# Patient Record
Sex: FEMALE | Race: WHITE | NOT HISPANIC OR LATINO | Employment: FULL TIME | URBAN - METROPOLITAN AREA
[De-identification: names, ages, dates, MRNs, and addresses within clinical notes are randomized per-mention and may not be internally consistent; named-entity substitution may affect disease eponyms.]

---

## 2017-02-08 ENCOUNTER — OFFICE VISIT (OUTPATIENT)
Dept: FAMILY MEDICINE CLINIC | Facility: CLINIC | Age: 32
End: 2017-02-08

## 2017-02-08 VITALS
HEART RATE: 57 BPM | BODY MASS INDEX: 19.36 KG/M2 | OXYGEN SATURATION: 100 % | WEIGHT: 116.2 LBS | HEIGHT: 65 IN | DIASTOLIC BLOOD PRESSURE: 76 MMHG | TEMPERATURE: 98.5 F | SYSTOLIC BLOOD PRESSURE: 110 MMHG

## 2017-02-08 DIAGNOSIS — G47.00 INSOMNIA, UNSPECIFIED TYPE: ICD-10-CM

## 2017-02-08 DIAGNOSIS — F41.9 ANXIETY: Primary | ICD-10-CM

## 2017-02-08 PROCEDURE — 99214 OFFICE O/P EST MOD 30 MIN: CPT | Performed by: NURSE PRACTITIONER

## 2017-02-08 RX ORDER — CITALOPRAM 40 MG/1
40 TABLET ORAL DAILY
Qty: 90 TABLET | Refills: 3 | Status: SHIPPED | OUTPATIENT
Start: 2017-02-08 | End: 2017-04-07

## 2017-02-08 RX ORDER — BUSPIRONE HYDROCHLORIDE 30 MG/1
30 TABLET ORAL NIGHTLY
Qty: 10 TABLET | Refills: 0 | Status: SHIPPED | OUTPATIENT
Start: 2017-02-08 | End: 2017-02-14 | Stop reason: SDUPTHER

## 2017-02-13 ENCOUNTER — TELEPHONE (OUTPATIENT)
Dept: FAMILY MEDICINE CLINIC | Facility: CLINIC | Age: 32
End: 2017-02-13

## 2017-02-13 NOTE — TELEPHONE ENCOUNTER
Patient was prescribed Buspar to help her sleep patient was told to call and let Sohail know how it worked over the weekend. Patient says it helped with jaw tightness but did not help settle mind to fall asleep and her legs were still restless

## 2017-02-14 DIAGNOSIS — G47.00 INSOMNIA, UNSPECIFIED TYPE: ICD-10-CM

## 2017-02-14 DIAGNOSIS — F41.9 ANXIETY: ICD-10-CM

## 2017-02-14 RX ORDER — BUSPIRONE HYDROCHLORIDE 30 MG/1
TABLET ORAL
Qty: 10 TABLET | Refills: 0 | Status: SHIPPED | OUTPATIENT
Start: 2017-02-14 | End: 2017-04-07

## 2017-02-14 NOTE — TELEPHONE ENCOUNTER
Patient said she would really like to try something else. She just found out her sister passed away last night and is really wanting something that is going to help

## 2017-02-15 RX ORDER — ZOLPIDEM TARTRATE 10 MG/1
10 TABLET ORAL NIGHTLY PRN
Qty: 30 TABLET | Refills: 0 | OUTPATIENT
Start: 2017-02-15 | End: 2017-04-07

## 2017-02-27 ENCOUNTER — OFFICE VISIT (OUTPATIENT)
Dept: FAMILY MEDICINE CLINIC | Facility: CLINIC | Age: 32
End: 2017-02-27

## 2017-02-27 VITALS
SYSTOLIC BLOOD PRESSURE: 102 MMHG | OXYGEN SATURATION: 98 % | DIASTOLIC BLOOD PRESSURE: 60 MMHG | HEIGHT: 65 IN | HEART RATE: 69 BPM | TEMPERATURE: 98.2 F | WEIGHT: 113 LBS | BODY MASS INDEX: 18.83 KG/M2

## 2017-02-27 DIAGNOSIS — G43.809 OTHER TYPE OF MIGRAINE: Primary | ICD-10-CM

## 2017-02-27 PROBLEM — G43.909 MIGRAINE HEADACHE: Status: ACTIVE | Noted: 2017-02-27

## 2017-02-27 PROCEDURE — 99213 OFFICE O/P EST LOW 20 MIN: CPT | Performed by: NURSE PRACTITIONER

## 2017-02-27 NOTE — PROGRESS NOTES
"Subjective   Peggy Davenport is a 32 y.o. female.     History of Present Illness   Patient presents to the office today with migraine that she's had consistently for the past 4 days.  Patient states whenever she gets one to usually take Advil which knocks it out this time she took Advil did work but the headache returned after an hour.  He is the same as it always is gone the right frontal side and affecting her ear causing pressure in her ear.  Patient does not have any light sensitivity or noise sensitivity.  Patient states she has a headache almost every single day and Advil usually works for it.  She's never tried any other medication for her headaches other than Advil and she's never seen a neurologist for  The following portions of the patient's history were reviewed and updated as appropriate: allergies, current medications, past social history and problem list.    Review of Systems   Neurological: Positive for headaches.   All other systems reviewed and are negative.      Objective   Visit Vitals   • /60 (BP Location: Left arm, Patient Position: Sitting)   • Pulse 69   • Temp 98.2 °F (36.8 °C)   • Ht 65\" (165.1 cm)   • Wt 113 lb (51.3 kg)   • SpO2 98%   • BMI 18.8 kg/m2     Physical Exam   Constitutional: She is oriented to person, place, and time. Vital signs are normal. She appears well-developed and well-nourished. No distress.   HENT:   Head: Normocephalic.   Cardiovascular: Normal rate, regular rhythm and normal heart sounds.    Pulmonary/Chest: Effort normal and breath sounds normal.   Neurological: She is alert and oriented to person, place, and time. Gait normal.   Psychiatric: She has a normal mood and affect. Her behavior is normal. Judgment and thought content normal.   Vitals reviewed.      Assessment/Plan   Problem List Items Addressed This Visit        Cardiovascular and Mediastinum    Migraine headache - Primary        treximet samples given   rto prn      Discussed referral to " neurology for possible Botox injections patient stated that she would like to think about it

## 2017-03-13 RX ORDER — PRAMIPEXOLE DIHYDROCHLORIDE 0.12 MG/1
0.12 TABLET ORAL
Qty: 30 TABLET | Refills: 0 | Status: SHIPPED | OUTPATIENT
Start: 2017-03-13 | End: 2017-04-07

## 2017-03-23 DIAGNOSIS — F41.9 ANXIETY: Primary | ICD-10-CM

## 2017-03-23 RX ORDER — TRAZODONE HYDROCHLORIDE 100 MG/1
100 TABLET ORAL NIGHTLY
Qty: 30 TABLET | Refills: 6 | Status: SHIPPED | OUTPATIENT
Start: 2017-03-23 | End: 2017-06-28 | Stop reason: ALTCHOICE

## 2017-04-07 ENCOUNTER — HOSPITAL ENCOUNTER (EMERGENCY)
Facility: HOSPITAL | Age: 32
Discharge: HOME | End: 2017-04-08
Attending: EMERGENCY MEDICINE | Admitting: EMERGENCY MEDICINE

## 2017-04-07 ENCOUNTER — APPOINTMENT (OUTPATIENT)
Dept: CT IMAGING | Facility: HOSPITAL | Age: 32
End: 2017-04-07

## 2017-04-07 DIAGNOSIS — M54.50 ACUTE LOW BACK PAIN WITHOUT SCIATICA, UNSPECIFIED BACK PAIN LATERALITY: Primary | ICD-10-CM

## 2017-04-07 LAB
ALBUMIN SERPL-MCNC: 4.2 G/DL (ref 3.5–5.2)
ALBUMIN/GLOB SERPL: 1.5 G/DL
ALP SERPL-CCNC: 57 U/L (ref 39–117)
ALT SERPL W P-5'-P-CCNC: 10 U/L (ref 1–33)
ANION GAP SERPL CALCULATED.3IONS-SCNC: 11.8 MMOL/L
AST SERPL-CCNC: 9 U/L (ref 1–32)
BASOPHILS # BLD AUTO: 0.06 10*3/MM3 (ref 0–0.2)
BASOPHILS NFR BLD AUTO: 0.8 % (ref 0–1.5)
BILIRUB SERPL-MCNC: <0.2 MG/DL (ref 0.1–1.2)
BILIRUB UR QL STRIP: NEGATIVE
BUN BLD-MCNC: 10 MG/DL (ref 6–20)
BUN/CREAT SERPL: 14.1 (ref 7–25)
CALCIUM SPEC-SCNC: 9.3 MG/DL (ref 8.6–10.5)
CHLORIDE SERPL-SCNC: 101 MMOL/L (ref 98–107)
CLARITY UR: CLEAR
CO2 SERPL-SCNC: 27.2 MMOL/L (ref 22–29)
COLOR UR: YELLOW
CREAT BLD-MCNC: 0.71 MG/DL (ref 0.57–1)
DEPRECATED RDW RBC AUTO: 48.4 FL (ref 37–54)
EOSINOPHIL # BLD AUTO: 0.24 10*3/MM3 (ref 0–0.7)
EOSINOPHIL NFR BLD AUTO: 3.2 % (ref 0.3–6.2)
ERYTHROCYTE [DISTWIDTH] IN BLOOD BY AUTOMATED COUNT: 14.4 % (ref 11.7–13)
GFR SERPL CREATININE-BSD FRML MDRD: 95 ML/MIN/1.73
GLOBULIN UR ELPH-MCNC: 2.8 GM/DL
GLUCOSE BLD-MCNC: 92 MG/DL (ref 65–99)
GLUCOSE UR STRIP-MCNC: NEGATIVE MG/DL
HCG SERPL QL: NEGATIVE
HCT VFR BLD AUTO: 37.8 % (ref 35.6–45.5)
HGB BLD-MCNC: 12.1 G/DL (ref 11.9–15.5)
HGB UR QL STRIP.AUTO: NEGATIVE
IMM GRANULOCYTES # BLD: 0.02 10*3/MM3 (ref 0–0.03)
IMM GRANULOCYTES NFR BLD: 0.3 % (ref 0–0.5)
KETONES UR QL STRIP: NEGATIVE
LEUKOCYTE ESTERASE UR QL STRIP.AUTO: NEGATIVE
LIPASE SERPL-CCNC: 41 U/L (ref 13–60)
LYMPHOCYTES # BLD AUTO: 2.78 10*3/MM3 (ref 0.9–4.8)
LYMPHOCYTES NFR BLD AUTO: 37.1 % (ref 19.6–45.3)
MCH RBC QN AUTO: 29.4 PG (ref 26.9–32)
MCHC RBC AUTO-ENTMCNC: 32 G/DL (ref 32.4–36.3)
MCV RBC AUTO: 91.7 FL (ref 80.5–98.2)
MONOCYTES # BLD AUTO: 0.52 10*3/MM3 (ref 0.2–1.2)
MONOCYTES NFR BLD AUTO: 6.9 % (ref 5–12)
NEUTROPHILS # BLD AUTO: 3.88 10*3/MM3 (ref 1.9–8.1)
NEUTROPHILS NFR BLD AUTO: 51.7 % (ref 42.7–76)
NITRITE UR QL STRIP: NEGATIVE
PH UR STRIP.AUTO: 7.5 [PH] (ref 5–8)
PLATELET # BLD AUTO: 274 10*3/MM3 (ref 140–500)
PMV BLD AUTO: 10 FL (ref 6–12)
POTASSIUM BLD-SCNC: 4.4 MMOL/L (ref 3.5–5.2)
PROT SERPL-MCNC: 7 G/DL (ref 6–8.5)
PROT UR QL STRIP: NEGATIVE
RBC # BLD AUTO: 4.12 10*6/MM3 (ref 3.9–5.2)
SODIUM BLD-SCNC: 140 MMOL/L (ref 136–145)
SP GR UR STRIP: 1.02 (ref 1–1.03)
UROBILINOGEN UR QL STRIP: NORMAL
WBC NRBC COR # BLD: 7.5 10*3/MM3 (ref 4.5–10.7)

## 2017-04-07 PROCEDURE — 96361 HYDRATE IV INFUSION ADD-ON: CPT

## 2017-04-07 PROCEDURE — 96375 TX/PRO/DX INJ NEW DRUG ADDON: CPT

## 2017-04-07 PROCEDURE — 25010000002 KETOROLAC TROMETHAMINE PER 15 MG: Performed by: PHYSICIAN ASSISTANT

## 2017-04-07 PROCEDURE — 96374 THER/PROPH/DIAG INJ IV PUSH: CPT

## 2017-04-07 PROCEDURE — 81003 URINALYSIS AUTO W/O SCOPE: CPT | Performed by: EMERGENCY MEDICINE

## 2017-04-07 PROCEDURE — 84703 CHORIONIC GONADOTROPIN ASSAY: CPT | Performed by: EMERGENCY MEDICINE

## 2017-04-07 PROCEDURE — 80053 COMPREHEN METABOLIC PANEL: CPT | Performed by: EMERGENCY MEDICINE

## 2017-04-07 PROCEDURE — 99283 EMERGENCY DEPT VISIT LOW MDM: CPT

## 2017-04-07 PROCEDURE — 36415 COLL VENOUS BLD VENIPUNCTURE: CPT | Performed by: EMERGENCY MEDICINE

## 2017-04-07 PROCEDURE — 0 IOPAMIDOL 61 % SOLUTION: Performed by: EMERGENCY MEDICINE

## 2017-04-07 PROCEDURE — 83690 ASSAY OF LIPASE: CPT | Performed by: EMERGENCY MEDICINE

## 2017-04-07 PROCEDURE — 85025 COMPLETE CBC W/AUTO DIFF WBC: CPT | Performed by: EMERGENCY MEDICINE

## 2017-04-07 PROCEDURE — 25010000002 ONDANSETRON PER 1 MG: Performed by: PHYSICIAN ASSISTANT

## 2017-04-07 PROCEDURE — 74177 CT ABD & PELVIS W/CONTRAST: CPT

## 2017-04-07 RX ORDER — KETOROLAC TROMETHAMINE 15 MG/ML
15 INJECTION, SOLUTION INTRAMUSCULAR; INTRAVENOUS ONCE
Status: COMPLETED | OUTPATIENT
Start: 2017-04-07 | End: 2017-04-07

## 2017-04-07 RX ORDER — SODIUM CHLORIDE 0.9 % (FLUSH) 0.9 %
10 SYRINGE (ML) INJECTION AS NEEDED
Status: DISCONTINUED | OUTPATIENT
Start: 2017-04-07 | End: 2017-04-08 | Stop reason: HOSPADM

## 2017-04-07 RX ORDER — ONDANSETRON 2 MG/ML
4 INJECTION INTRAMUSCULAR; INTRAVENOUS ONCE
Status: COMPLETED | OUTPATIENT
Start: 2017-04-07 | End: 2017-04-07

## 2017-04-07 RX ADMIN — ONDANSETRON 4 MG: 2 INJECTION INTRAMUSCULAR; INTRAVENOUS at 23:07

## 2017-04-07 RX ADMIN — KETOROLAC TROMETHAMINE 15 MG: 15 INJECTION, SOLUTION INTRAMUSCULAR; INTRAVENOUS at 23:09

## 2017-04-07 RX ADMIN — IOPAMIDOL 85 ML: 612 INJECTION, SOLUTION INTRAVENOUS at 23:45

## 2017-04-07 RX ADMIN — SODIUM CHLORIDE 1000 ML: 9 INJECTION, SOLUTION INTRAVENOUS at 23:05

## 2017-04-07 NOTE — ED NOTES
"Pt seen at Urgent Care, referred to ED for possible UTI.    Pt states \"I'm not having the urge to go, so I have to remind myself to go.  When I do go, it doesn't feel normal, like it's thick.  I'm also having a lot of pain when I urinate and lower back pain\".     Charlotte Strange RN  04/07/17 1942    "

## 2017-04-08 VITALS
OXYGEN SATURATION: 100 % | WEIGHT: 120 LBS | SYSTOLIC BLOOD PRESSURE: 124 MMHG | RESPIRATION RATE: 16 BRPM | HEIGHT: 65 IN | HEART RATE: 57 BPM | TEMPERATURE: 98.3 F | DIASTOLIC BLOOD PRESSURE: 86 MMHG | BODY MASS INDEX: 19.99 KG/M2

## 2017-04-08 LAB — WHOLE BLOOD HOLD SPECIMEN: NORMAL

## 2017-04-08 RX ORDER — NAPROXEN 500 MG/1
500 TABLET ORAL 2 TIMES DAILY WITH MEALS
Qty: 20 TABLET | Refills: 0 | Status: SHIPPED | OUTPATIENT
Start: 2017-04-08 | End: 2017-08-03

## 2017-04-08 RX ORDER — METHOCARBAMOL 500 MG/1
1000 TABLET, FILM COATED ORAL 4 TIMES DAILY
Qty: 30 TABLET | Refills: 0 | Status: SHIPPED | OUTPATIENT
Start: 2017-04-08 | End: 2017-08-03

## 2017-04-08 NOTE — DISCHARGE INSTRUCTIONS
Home, rest, medicine as directed, home medicine as prescribed, follow up with PCP or OB/GYN for recheck. Return to care with further concerns.

## 2017-04-08 NOTE — ED PROVIDER NOTES
EMERGENCY DEPARTMENT ENCOUNTER    CHIEF COMPLAINT  Chief Complaint: Dysuria  History given by: Patient  History limited by:   Room Number: 08/08  PMD: LUCIANA Vigil      HPI:  Pt is a 32 y.o. female who presents complaining of dysuria that onset several weeks ago. Pt reports that she is having lack of urgency to urinate. Pt reports that when urinating she reports a burning senstation with straining. Pt also reports some abd pain and low back pain. Pt describes the abd pain as sharp. She denies any decreased liquid intake. Pt denies any hx of nephrolithiasis or ovarian cysts, denies vaginal discharge or STD concerns.  Pt was seen at  today and referred to the ED.     Duration:  Several weeks  Onset: gradual  Timing: intermittent  Location: abd, lower back  Radiation: none  Quality: sharp   Intensity/Severity: moderate  Progression: unchanged  Associated Symptoms: abd pain, decreased urgency, dysuria, low back pain  Aggravating Factors: urination  Alleviating Factors: none  Previous Episodes: none  Treatment before arrival: seen at  and referred to ED.     PAST MEDICAL HISTORY  Active Ambulatory Problems     Diagnosis Date Noted   • Routine general medical examination at a health care facility 08/10/2016   • Chronic fatigue 08/10/2016   • Muscle ache 08/10/2016   • Anxiety 08/10/2016   • Pap smear, as part of routine gynecological examination 09/22/2016   • Diarrhea 09/22/2016   • Insomnia 02/08/2017   • Migraine headache 02/27/2017     Resolved Ambulatory Problems     Diagnosis Date Noted   • No Resolved Ambulatory Problems     Past Medical History:   Diagnosis Date   • GERD (gastroesophageal reflux disease)    • Hernia, hiatal    • Ulcer of abdomen wall 2007       PAST SURGICAL HISTORY  Past Surgical History:   Procedure Laterality Date   • ENDOSCOPY N/A 6/22/2016    Procedure: ESOPHAGOGASTRODUODENOSCOPY WITH BIOPSIES;  Surgeon: Tim Hunter MD;  Location: [facility] ENDOSCOPY;  Service:    • EYE  SURGERY Left 1986    STAUBISMIS   • LAPAROSCOPIC TUBAL LIGATION  2012       FAMILY HISTORY  Family History   Problem Relation Age of Onset   • Diabetes Mother    • Asthma Father    • Hyperlipidemia Father    • Hypertension Father    • Cervical cancer Maternal Grandmother    • Uterine cancer Paternal Grandmother        SOCIAL HISTORY  Social History     Social History   • Marital status: Single     Spouse name: N/A   • Number of children: N/A   • Years of education: N/A     Occupational History   • Not on file.     Social History Main Topics   • Smoking status: Never Smoker   • Smokeless tobacco: Never Used   • Alcohol use No   • Drug use: No   • Sexual activity: Defer     Other Topics Concern   • Not on file     Social History Narrative       ALLERGIES  Review of patient's allergies indicates no known allergies.    REVIEW OF SYSTEMS  Review of Systems   Constitutional: Negative for fever.   HENT: Negative for sore throat.    Eyes: Negative.    Respiratory: Negative for cough and shortness of breath.    Cardiovascular: Negative for chest pain.   Gastrointestinal: Positive for abdominal pain. Negative for diarrhea and vomiting.   Genitourinary: Positive for difficulty urinating, dysuria and urgency ( decreased).   Musculoskeletal: Positive for back pain. Negative for neck pain.   Skin: Negative for rash.   Allergic/Immunologic: Negative.    Neurological: Negative for weakness, numbness and headaches.   Hematological: Negative.    Psychiatric/Behavioral: Negative.    All other systems reviewed and are negative.      PHYSICAL EXAM  ED Triage Vitals   Temp Heart Rate Resp BP SpO2   04/07/17 1948 04/07/17 1942 04/07/17 1942 04/07/17 1948 04/07/17 1942   97.6 °F (36.4 °C) 88 14 107/71 100 %      Temp src Heart Rate Source Patient Position BP Location FiO2 (%)   04/07/17 1948 04/07/17 1942 04/07/17 1948 04/07/17 1948 --   Tympanic Monitor Sitting Right arm        Physical Exam   Constitutional: She is oriented to person,  place, and time and well-developed, well-nourished, and in no distress. No distress.   HENT:   Head: Normocephalic and atraumatic.   Eyes: EOM are normal. Pupils are equal, round, and reactive to light.   Neck: Normal range of motion. Neck supple.   Cardiovascular: Normal rate, regular rhythm and normal heart sounds.    Pulmonary/Chest: Effort normal and breath sounds normal. No respiratory distress.   Abdominal: Soft. Bowel sounds are normal. There is tenderness in the suprapubic area. There is no rebound and no guarding.   Musculoskeletal: Normal range of motion. She exhibits no edema.   Neurological: She is alert and oriented to person, place, and time. She has normal sensation and normal strength.   Skin: Skin is warm and dry. No rash noted.   Psychiatric: Mood and affect normal.   Nursing note and vitals reviewed.      LAB RESULTS  Lab Results (last 24 hours)     Procedure Component Value Units Date/Time    POCT Urinalysis (manual dipstick) [[id number]]  (Abnormal) Collected:  04/07/17 1840    Specimen:  Urine Updated:  04/07/17 1845     Color Libertad     Clarity, UA Hazy (A)     Glucose, UA Negative mg/dL      Bilirubin Negative     Ketones, UA Negative     Specific Gravity  1.025     Blood, UA Negative     pH, Urine 5.0     Protein, POC Trace (A) mg/dL      Urobilinogen, UA Normal     Leukocytes Negative     Nitrite, UA Negative    Urine Culture [[id number]] Collected:  04/07/17 1904    Specimen:  Urine from Urine, Clean Catch Updated:  04/07/17 1905    Urinalysis With / Culture If Indicated [[id number]]  (Normal) Collected:  04/07/17 2003    Specimen:  Urine from Urine, Clean Catch Updated:  04/07/17 2038     Color, UA Yellow     Appearance, UA Clear     pH, UA 7.5     Specific Gravity, UA 1.017     Glucose, UA Negative     Ketones, UA Negative     Bilirubin, UA Negative     Blood, UA Negative     Protein, UA Negative     Leuk Esterase, UA Negative     Nitrite, UA Negative     Urobilinogen, UA 1.0 E.U./dL     Narrative:       Urine microscopic not indicated.    CBC & Differential [[id number]] Collected:  04/07/17 2037    Specimen:  Blood Updated:  04/07/17 2054    Narrative:       The following orders were created for panel order CBC & Differential.  Procedure                               Abnormality         Status                     ---------                               -----------         ------                     CBC Auto Differential[[id number]]         Abnormal            Final result                 Please view results for these tests on the individual orders.    Comprehensive Metabolic Panel [[id number]] Collected:  04/07/17 2037    Specimen:  Blood Updated:  04/07/17 2121     Glucose 92 mg/dL      BUN 10 mg/dL      Creatinine 0.71 mg/dL      Sodium 140 mmol/L      Potassium 4.4 mmol/L      Chloride 101 mmol/L      CO2 27.2 mmol/L      Calcium 9.3 mg/dL      Total Protein 7.0 g/dL      Albumin 4.20 g/dL      ALT (SGPT) 10 U/L      AST (SGOT) 9 U/L      Alkaline Phosphatase 57 U/L      Total Bilirubin <0.2 mg/dL      eGFR Non African Amer 95 mL/min/1.73      Globulin 2.8 gm/dL      A/G Ratio 1.5 g/dL      BUN/Creatinine Ratio 14.1     Anion Gap 11.8 mmol/L     Lipase [[id number]]  (Normal) Collected:  04/07/17 2037    Specimen:  Blood Updated:  04/07/17 2116     Lipase 41 U/L     hCG, Serum, Qualitative [[id number]]  (Normal) Collected:  04/07/17 2037    Specimen:  Blood Updated:  04/07/17 2110     HCG Qualitative Negative    CBC Auto Differential [[id number]]  (Abnormal) Collected:  04/07/17 2037    Specimen:  Blood Updated:  04/07/17 2054     WBC 7.50 10*3/mm3      RBC 4.12 10*6/mm3      Hemoglobin 12.1 g/dL      Hematocrit 37.8 %      MCV 91.7 fL      MCH 29.4 pg      MCHC 32.0 (L) g/dL      RDW 14.4 (H) %      RDW-SD 48.4 fl      MPV 10.0 fL      Platelets 274 10*3/mm3      Neutrophil % 51.7 %      Lymphocyte % 37.1 %      Monocyte % 6.9 %      Eosinophil % 3.2 %      Basophil % 0.8 %      Immature Grans % 0.3 %       Neutrophils, Absolute 3.88 10*3/mm3      Lymphocytes, Absolute 2.78 10*3/mm3      Monocytes, Absolute 0.52 10*3/mm3      Eosinophils, Absolute 0.24 10*3/mm3      Basophils, Absolute 0.06 10*3/mm3      Immature Grans, Absolute 0.02 10*3/mm3           I ordered the above labs and reviewed the results    RADIOLOGY  CT Abdomen Pelvis With Contrast   Final Result   Final result by Etienne High MD (17 00:11:45)     Narrative:     CT SCAN OF THE ABDOMEN AND PELVIS WITH INTRAVENOUS CONTRAST     HISTORY: Dysuria and urinary urgency.     FINDINGS: The CT scan was performed as an emergency procedure through  the abdomen and pelvis with intravenous contrast and demonstrates the  followin. The kidneys are normal in size and there is symmetric enhancement.  There is no evidence of renal obstruction. The urinary bladder has a  smooth contour and shows no wall thickening.     2. The lung bases are clear. The liver, spleen, pancreas, and both  adrenal glands were unremarkable. The aorta and retroperitoneal  structures appear normal.     3. The large and small bowel loops are normal in caliber. There is a  moderate amount of fluid scattered in the small bowel which is  nonspecific but could relate to an element of mild gastroenteritis and  further clinical correlation is recommended.     4. In the pelvis, there is mild generalized increased vascularity of the  uterus which is a nonspecific finding and may relate to the patient's  menstrual cycle. No focal lesions are identified. There is a small right  ovarian cyst measuring 1.7 cm. There is also very small amount of free  fluid in the cul-de-sac which is nonspecific and may be physiologic or  possibly related to recent cyst rupture.     This report was finalized on 2017 12:11 AM by Dr. Carlton High MD.             I ordered the above noted radiological studies. Interpreted by radiologist. Reviewed by me in PACS.       PROCEDURES  Procedures      PROGRESS  AND CONSULTS  ED Course   10:55 PM  Ordered CT abd/pel. Ordered Toradol and Zofran.   12:35 AM  Rechecked with pt. Informed pt of her labs showing nothing remarkable and her CT showing ovarian cyst, but otherwise negative. Discussed with pt about plan to discharge and instructed to follow up with OBGYN. Pt understands and agrees with plan. All concerns addressed.       MEDICAL DECISION MAKING      MDM       DIAGNOSIS  Final diagnoses:   Acute low back pain without sciatica, unspecified back pain laterality       DISPOSITION  DISCHARGE    Patient discharged in stable condition.    Reviewed implications of results, diagnosis, meds, responsibility to follow up, warning signs and symptoms of possible worsening, potential complications and reasons to return to ER.    Patient/Family voiced understanding of above instructions.    Discussed plan for discharge, as there is no emergent indication for admission.  Pt/family is agreeable and understands need for follow up and repeat testing.  Pt is aware that discharge does not mean that nothing is wrong but it indicates no emergency is present that requires admission and they must continue care with follow-up as given below or physician of their choice.     FOLLOW-UP  Sohail Mckinney, APRN  [address] [id number] [phone]    Schedule an appointment as soon as possible for a visit in 3 days           Medication List      New Prescriptions          methocarbamol 500 MG tablet   Commonly known as:  ROBAXIN   Take 2 tablets by mouth 4 (Four) Times a Day.       naproxen 500 MG tablet   Commonly known as:  NAPROSYN   Take 1 tablet by mouth 2 (Two) Times a Day With Meals.               Latest Documented Vital Signs:  As of 1:29 AM  BP- 124/86 HR- 57 Temp- 98.3 °F (36.8 °C) (Tympanic) O2 sat- 100%    --  Documentation assistance provided by no Salvador for Roberto Carlos Tierney PA-C.  Information recorded by the no was done at my direction and has been  verified and validated by me.       Romeo Salvador  04/07/17 2256       Romeo Salvador  04/08/17 0113       ZULMA Pacheco  04/08/17 0129

## 2017-04-11 ENCOUNTER — TELEPHONE (OUTPATIENT)
Dept: SOCIAL WORK | Facility: HOSPITAL | Age: 32
End: 2017-04-11

## 2017-04-11 NOTE — TELEPHONE ENCOUNTER
Spoke with pt today in f/u and she states that she is feeling much better. She was able to get her scripts filled and is taking them as needed. She has not ciro a f/u with her PCP and will do so if necessary. No other questions or concerns voiced by pt at this time.  Alicia MAN

## 2017-06-28 ENCOUNTER — OFFICE VISIT (OUTPATIENT)
Dept: FAMILY MEDICINE CLINIC | Facility: CLINIC | Age: 32
End: 2017-06-28

## 2017-06-28 VITALS
OXYGEN SATURATION: 98 % | DIASTOLIC BLOOD PRESSURE: 64 MMHG | HEIGHT: 65 IN | SYSTOLIC BLOOD PRESSURE: 118 MMHG | RESPIRATION RATE: 14 BRPM | WEIGHT: 114.4 LBS | TEMPERATURE: 98.8 F | BODY MASS INDEX: 19.06 KG/M2 | HEART RATE: 67 BPM

## 2017-06-28 DIAGNOSIS — G43.809 OTHER TYPE OF MIGRAINE: ICD-10-CM

## 2017-06-28 DIAGNOSIS — G47.00 INSOMNIA, UNSPECIFIED TYPE: Primary | ICD-10-CM

## 2017-06-28 PROCEDURE — 99214 OFFICE O/P EST MOD 30 MIN: CPT | Performed by: NURSE PRACTITIONER

## 2017-06-28 RX ORDER — ESZOPICLONE 2 MG/1
2 TABLET, FILM COATED ORAL NIGHTLY
Qty: 30 TABLET | Refills: 0 | Status: SHIPPED | OUTPATIENT
Start: 2017-06-28 | End: 2017-08-03

## 2017-06-28 NOTE — PROGRESS NOTES
"Subjective   Peggy Davenport is a 32 y.o. female.     History of Present Illness   Patient presenting to the office today to discuss her insomnia.  At first the trazodone was working very well but lately she will wake up extremely groggy and will take her very long time to wake up in figure out what she needs to do for the day.  This concerns her and also she has small children and she feels that they needed her in the night she would be able to wake up to help him.  She's here to see if there is another type of medication that will work for her.  In the past she has used Ambien and didn't like either due to the fact that it just knocks her out.  Unfortunately she works third shift 3 nights a week so she's changing back and forth between sleeping at night and sleeping during the day which does not help and is the root of the problem.    She would also like referral to neurology for her migraines they are continually getting worse.    The following portions of the patient's history were reviewed and updated as appropriate: allergies, current medications, past social history and problem list.    Review of Systems   Constitutional: Negative for activity change.   All other systems reviewed and are negative.      Objective   /64 (BP Location: Right arm, Patient Position: Sitting, Cuff Size: Adult)  Pulse 67  Temp 98.8 °F (37.1 °C) (Oral)   Resp 14  Ht 65\" (165.1 cm)  Wt 114 lb 6.4 oz (51.9 kg)  SpO2 98%  BMI 19.04 kg/m2  Physical Exam   Constitutional: She is oriented to person, place, and time. Vital signs are normal. She appears well-developed and well-nourished. No distress.   HENT:   Head: Normocephalic.   Cardiovascular: Normal rate, regular rhythm and normal heart sounds.    Pulmonary/Chest: Effort normal and breath sounds normal.   Neurological: She is alert and oriented to person, place, and time. Gait normal.   Psychiatric: She has a normal mood and affect. Her behavior is normal. Judgment and " thought content normal.   Vitals reviewed.      Assessment/Plan   Problem List Items Addressed This Visit        Cardiovascular and Mediastinum    Migraine headache    Relevant Orders    Ambulatory Referral to Neurology       Other    Insomnia - Primary    Relevant Medications    eszopiclone (LUNESTA) 2 MG tablet        rto in 3 mons

## 2017-07-17 RX ORDER — TIZANIDINE HYDROCHLORIDE 4 MG/1
4 CAPSULE, GELATIN COATED ORAL 3 TIMES DAILY
Qty: 30 CAPSULE | Refills: 0 | Status: SHIPPED | OUTPATIENT
Start: 2017-07-17 | End: 2017-08-03

## 2017-08-03 ENCOUNTER — OFFICE VISIT (OUTPATIENT)
Dept: NEUROLOGY | Facility: CLINIC | Age: 32
End: 2017-08-03

## 2017-08-03 VITALS
BODY MASS INDEX: 20.32 KG/M2 | HEIGHT: 64 IN | DIASTOLIC BLOOD PRESSURE: 62 MMHG | SYSTOLIC BLOOD PRESSURE: 120 MMHG | WEIGHT: 119 LBS

## 2017-08-03 DIAGNOSIS — R41.3 MEMORY LOSS: ICD-10-CM

## 2017-08-03 DIAGNOSIS — G43.009 MIGRAINE WITHOUT AURA AND WITHOUT STATUS MIGRAINOSUS, NOT INTRACTABLE: Primary | ICD-10-CM

## 2017-08-03 PROCEDURE — 99244 OFF/OP CNSLTJ NEW/EST MOD 40: CPT | Performed by: PSYCHIATRY & NEUROLOGY

## 2017-08-03 RX ORDER — TIZANIDINE 4 MG/1
4 TABLET ORAL NIGHTLY
Qty: 30 TABLET | Refills: 11 | Status: SHIPPED | OUTPATIENT
Start: 2017-08-03 | End: 2017-08-31 | Stop reason: SDUPTHER

## 2017-08-03 NOTE — PROGRESS NOTES
Subjective:     Patient ID: Peggy Davenport is a 32 y.o. female.    History of Present Illness     The patient is a 32-year-old right-handed woman who was seen for further evaluation of headaches. The patient was seen today in consultation per the request of Sohail Mckinney.  The patient has had headaches for about a year these are constant and can be severe at times they start in her neck and feels like a squeezing or pressure sensation are mainly over the top of the head the time she gets some blurred vision and light and noise sensitivity as well as nausea.  This seems to build up as the day goes on.  She also complains of memory loss over the same period of time.  Is not had a neurodiagnostic studies.  She is tried various over-the-counter medicines without success.  She was given Treximet which was not helpful.  His been under fair amount of stress.    The following portions of the patient's history were reviewed and updated as appropriate: allergies, current medications, past family history, past medical history, past social history, past surgical history and problem list.     Family History   Problem Relation Age of Onset   • Diabetes Mother    • Migraines Mother    • Dementia Mother    • Hypertension Mother    • Thyroid disease Mother    • Asthma Father    • Hyperlipidemia Father    • Hypertension Father    • Cervical cancer Maternal Grandmother    • Dementia Maternal Grandmother    • Uterine cancer Paternal Grandmother    • Dementia Paternal Grandmother    • Stroke Paternal Grandmother    • Hypertension Paternal Grandmother    • Heart disease Paternal Grandmother    • Diabetes Paternal Grandmother    • Thyroid disease Paternal Grandmother    • Kidney disease Paternal Grandmother    • Heart disease Maternal Grandfather    • Heart disease Paternal Grandfather      Active Ambulatory Problems     Diagnosis Date Noted   • Routine general medical examination at a health care facility 08/10/2016   • Chronic fatigue  08/10/2016   • Muscle ache 08/10/2016   • Anxiety 08/10/2016   • Diarrhea 09/22/2016   • Insomnia 02/08/2017   • Migraine headache 02/27/2017   • Memory loss 08/03/2017     Resolved Ambulatory Problems     Diagnosis Date Noted   • Pap smear, as part of routine gynecological examination 09/22/2016     Past Medical History:   Diagnosis Date   • Depression    • GERD (gastroesophageal reflux disease)    • Hernia, hiatal    • Migraine    • Murmur, cardiac    • Ulcer of abdomen wall 2007     Social History     Social History   • Marital status: Single     Spouse name: N/A   • Number of children: N/A   • Years of education: N/A     Occupational History   • Not on file.     Social History Main Topics   • Smoking status: Never Smoker   • Smokeless tobacco: Never Used   • Alcohol use No   • Drug use: No   • Sexual activity: Defer     Other Topics Concern   • Not on file     Social History Narrative       Current Outpatient Prescriptions:   •  B Complex Vitamins (VITAMIN B COMPLEX PO), Take  by mouth., Disp: , Rfl:   •  sertraline (ZOLOFT) 50 MG tablet, Take 1 tablet by mouth Daily., Disp: 30 tablet, Rfl: 6  •  tiZANidine (ZANAFLEX) 4 MG tablet, Take 1 tablet by mouth Every Night., Disp: 30 tablet, Rfl: 11    Review of Systems   Constitutional: Positive for activity change, appetite change, chills, diaphoresis, fatigue, fever and unexpected weight change.   HENT: Positive for ear pain, sinus pressure, tinnitus and trouble swallowing. Negative for congestion, dental problem, drooling, ear discharge, facial swelling, hearing loss, mouth sores, nosebleeds, postnasal drip, rhinorrhea, sneezing, sore throat and voice change.    Eyes: Positive for photophobia, pain, redness, itching and visual disturbance. Negative for discharge.   Respiratory: Negative.    Cardiovascular: Positive for chest pain and palpitations. Negative for leg swelling.   Gastrointestinal: Positive for abdominal pain. Negative for abdominal distention, anal  bleeding, blood in stool, constipation, diarrhea, nausea, rectal pain and vomiting.   Endocrine: Negative.    Musculoskeletal: Positive for arthralgias, back pain, neck pain and neck stiffness. Negative for gait problem, joint swelling and myalgias.   Skin: Negative.    Allergic/Immunologic: Negative.    Neurological: Positive for dizziness, weakness, numbness and headaches. Negative for tremors, seizures, syncope, facial asymmetry, speech difficulty and light-headedness.   Hematological: Negative for adenopathy. Bruises/bleeds easily.   Psychiatric/Behavioral: Positive for agitation, confusion, decreased concentration and sleep disturbance. Negative for behavioral problems, dysphoric mood, hallucinations, self-injury and suicidal ideas. The patient is nervous/anxious. The patient is not hyperactive.         Objective:    Neurologic Exam  Mental status examination showed normal orientation, memory, and speech.  Attention span and concentration were normal.  Fund of knowledge was normal.  Funduscopic showed no abnormality.  Visual fields were full.  Pupillary reflexes were 5 mm, symmetric, and equally reactive to light.  Eye movements were full and conjugate.  Gag reflex was normal.  Hearing was normal.  Muscles of mastication were normal.  No facial weakness was noted.  Shoulder shrug strength was normal bilaterally.  Tongue protrudes midline.  There is no drift of outstretched arms.  Deep tendon reflexes are 2+ and symmetric.  No focal weakness or atrophy was noted.  Tone was normal in all extremities.  No cerebellar signs were noted.  No abnormal movements were noted.  The patient's gait was normal.  No other pathologic reflexes such as Babinski's sign were noted.  No sensory abnormalities were noted.  Physical Exam    Assessment/Plan:     Peggy was seen today for migraine.    Diagnoses and all orders for this visit:    Migraine without aura and without status migrainosus, not intractable  -     MRI Brain With &  Without Contrast; Future    Memory loss  -     MRI Brain With & Without Contrast; Future    Other orders  -     tiZANidine (ZANAFLEX) 4 MG tablet; Take 1 tablet by mouth Every Night.       Prescription drug management - tizanidine as above    The headaches sound mixed in character.  They're chronic daily headaches with some migrainous characteristics.  Neurologic examination is normal.  However she also has a history of memory loss.  Given the above symptoms of a structural study is indicated.  I set up an MRI the brain with and without contrast.  The patient will call following this.  Also have started her on tizanidine 4 mg at night.  I plan to see her back in the office in 3 months.Thank you for allowing me to share in the care of this patient.  Mohamud Hurtado M.D.

## 2017-08-11 ENCOUNTER — HOSPITAL ENCOUNTER (OUTPATIENT)
Dept: MRI IMAGING | Facility: HOSPITAL | Age: 32
Discharge: HOME | End: 2017-08-11
Attending: PSYCHIATRY & NEUROLOGY | Admitting: PSYCHIATRY & NEUROLOGY

## 2017-08-11 ENCOUNTER — TELEPHONE (OUTPATIENT)
Dept: NEUROLOGY | Facility: CLINIC | Age: 32
End: 2017-08-11

## 2017-08-11 ENCOUNTER — OFFICE VISIT (OUTPATIENT)
Dept: FAMILY MEDICINE CLINIC | Facility: CLINIC | Age: 32
End: 2017-08-11

## 2017-08-11 VITALS
TEMPERATURE: 98.5 F | SYSTOLIC BLOOD PRESSURE: 114 MMHG | BODY MASS INDEX: 20.11 KG/M2 | OXYGEN SATURATION: 98 % | WEIGHT: 117.8 LBS | DIASTOLIC BLOOD PRESSURE: 68 MMHG | HEIGHT: 64 IN | HEART RATE: 72 BPM

## 2017-08-11 DIAGNOSIS — M54.50 ACUTE BILATERAL LOW BACK PAIN WITHOUT SCIATICA: Primary | ICD-10-CM

## 2017-08-11 DIAGNOSIS — R41.3 MEMORY LOSS: ICD-10-CM

## 2017-08-11 DIAGNOSIS — G43.009 MIGRAINE WITHOUT AURA AND WITHOUT STATUS MIGRAINOSUS, NOT INTRACTABLE: ICD-10-CM

## 2017-08-11 PROCEDURE — 70553 MRI BRAIN STEM W/O & W/DYE: CPT

## 2017-08-11 PROCEDURE — A9577 INJ MULTIHANCE: HCPCS | Performed by: PSYCHIATRY & NEUROLOGY

## 2017-08-11 PROCEDURE — 99213 OFFICE O/P EST LOW 20 MIN: CPT | Performed by: NURSE PRACTITIONER

## 2017-08-11 PROCEDURE — 0 GADOBENATE DIMEGLUMINE 529 MG/ML SOLUTION: Performed by: PSYCHIATRY & NEUROLOGY

## 2017-08-11 RX ORDER — METAXALONE 800 MG/1
800 TABLET ORAL 3 TIMES DAILY PRN
Qty: 30 TABLET | Refills: 0 | Status: SHIPPED | OUTPATIENT
Start: 2017-08-11 | End: 2017-08-16

## 2017-08-11 RX ADMIN — GADOBENATE DIMEGLUMINE 10 ML: 529 INJECTION, SOLUTION INTRAVENOUS at 09:08

## 2017-08-11 NOTE — TELEPHONE ENCOUNTER
----- Message from Vivek Nair sent at 8/11/2017  9:42 AM EDT -----  Contact: Patient  Patient stated that she had her MRI and her headaches are still there. Please call her as soon as you can so she can know her next step. Thank you

## 2017-08-11 NOTE — PROGRESS NOTES
"Subjective   Peggy Davenport is a 32 y.o. female.     History of Present Illness   Pt presenting to the office for low back pain that started last night after lifting her son several times in the air.  Across the low back without any leg involvement or tingling.  Using Zanaflex and heat and IBU which is helping but the zanaflex makes her very sleepy    The following portions of the patient's history were reviewed and updated as appropriate: allergies, current medications, past social history and problem list.    Review of Systems   Musculoskeletal: Positive for back pain.   All other systems reviewed and are negative.      Objective   /68 (BP Location: Left arm, Patient Position: Sitting)  Pulse 72  Temp 98.5 °F (36.9 °C)  Ht 64\" (162.6 cm)  Wt 117 lb 12.8 oz (53.4 kg)  SpO2 98%  BMI 20.22 kg/m2  Physical Exam   Constitutional: She is oriented to person, place, and time. Vital signs are normal. She appears well-developed and well-nourished. No distress.   HENT:   Head: Normocephalic.   Cardiovascular: Normal rate, regular rhythm and normal heart sounds.    Pulmonary/Chest: Effort normal and breath sounds normal.   Neurological: She is alert and oriented to person, place, and time. Gait normal.   Psychiatric: She has a normal mood and affect. Her behavior is normal. Judgment and thought content normal.   Vitals reviewed.      Assessment/Plan   Problem List Items Addressed This Visit        Other    Acute bilateral low back pain without sciatica - Primary    Relevant Medications    metaxalone (SKELAXIN) 800 MG tablet        Come same and use skelaxin during the day and zanaflex at night   rto prn  stretches     "

## 2017-08-11 NOTE — TELEPHONE ENCOUNTER
Tell patient MRI of the brain is normal.  She needs to give tizanidine a bit longer.  Call in 3 weeks for update with what to do with the tizanidine

## 2017-08-16 RX ORDER — CYCLOBENZAPRINE HCL 10 MG
10 TABLET ORAL 3 TIMES DAILY PRN
Qty: 30 TABLET | Refills: 0 | Status: SHIPPED | OUTPATIENT
Start: 2017-08-16 | End: 2017-11-24

## 2017-08-22 RX ORDER — ZOLPIDEM TARTRATE 10 MG/1
10 TABLET ORAL NIGHTLY PRN
Qty: 30 TABLET | Refills: 0 | OUTPATIENT
Start: 2017-08-22 | End: 2017-09-21 | Stop reason: SDUPTHER

## 2017-08-31 ENCOUNTER — TELEPHONE (OUTPATIENT)
Dept: FAMILY MEDICINE CLINIC | Facility: CLINIC | Age: 32
End: 2017-08-31

## 2017-08-31 ENCOUNTER — TELEPHONE (OUTPATIENT)
Dept: NEUROLOGY | Facility: CLINIC | Age: 32
End: 2017-08-31

## 2017-08-31 RX ORDER — TIZANIDINE 2 MG/1
6 TABLET ORAL NIGHTLY
Qty: 90 TABLET | Refills: 11 | OUTPATIENT
Start: 2017-08-31 | End: 2019-10-18

## 2017-09-14 ENCOUNTER — TELEPHONE (OUTPATIENT)
Dept: FAMILY MEDICINE CLINIC | Facility: CLINIC | Age: 32
End: 2017-09-14

## 2017-09-14 DIAGNOSIS — M54.2 NECK PAIN: ICD-10-CM

## 2017-09-14 DIAGNOSIS — G43.809 OTHER TYPE OF MIGRAINE: Primary | ICD-10-CM

## 2017-09-14 NOTE — TELEPHONE ENCOUNTER
Patient left message requesting a referral to an ortho specialist for her migraines and neck pain. She says the neurologist she sees just keeps prescribing muscle relaxer's and its not helping either problem

## 2017-09-18 ENCOUNTER — HOSPITAL ENCOUNTER (EMERGENCY)
Facility: HOSPITAL | Age: 32
Discharge: HOME | End: 2017-09-18
Attending: EMERGENCY MEDICINE | Admitting: EMERGENCY MEDICINE

## 2017-09-18 VITALS
DIASTOLIC BLOOD PRESSURE: 89 MMHG | BODY MASS INDEX: 18.66 KG/M2 | OXYGEN SATURATION: 99 % | RESPIRATION RATE: 18 BRPM | WEIGHT: 112 LBS | HEART RATE: 62 BPM | SYSTOLIC BLOOD PRESSURE: 122 MMHG | HEIGHT: 65 IN | TEMPERATURE: 98 F

## 2017-09-18 DIAGNOSIS — N12 PYELONEPHRITIS: Primary | ICD-10-CM

## 2017-09-18 LAB
ALBUMIN SERPL-MCNC: 4.4 G/DL (ref 3.5–5.2)
ALBUMIN/GLOB SERPL: 1.7 G/DL
ALP SERPL-CCNC: 58 U/L (ref 39–117)
ALT SERPL W P-5'-P-CCNC: 11 U/L (ref 1–33)
ANION GAP SERPL CALCULATED.3IONS-SCNC: 10.1 MMOL/L
AST SERPL-CCNC: 14 U/L (ref 1–32)
BACTERIA UR QL AUTO: ABNORMAL /HPF
BASOPHILS # BLD AUTO: 0.04 10*3/MM3 (ref 0–0.2)
BASOPHILS NFR BLD AUTO: 0.5 % (ref 0–1.5)
BILIRUB SERPL-MCNC: 0.6 MG/DL (ref 0.1–1.2)
BILIRUB UR QL STRIP: NEGATIVE
BUN BLD-MCNC: 9 MG/DL (ref 6–20)
BUN/CREAT SERPL: 11.8 (ref 7–25)
CALCIUM SPEC-SCNC: 9.5 MG/DL (ref 8.6–10.5)
CHLORIDE SERPL-SCNC: 101 MMOL/L (ref 98–107)
CLARITY UR: ABNORMAL
CO2 SERPL-SCNC: 28.9 MMOL/L (ref 22–29)
COLOR UR: YELLOW
CREAT BLD-MCNC: 0.76 MG/DL (ref 0.57–1)
DEPRECATED RDW RBC AUTO: 46.4 FL (ref 37–54)
EOSINOPHIL # BLD AUTO: 0.35 10*3/MM3 (ref 0–0.7)
EOSINOPHIL NFR BLD AUTO: 4.4 % (ref 0.3–6.2)
ERYTHROCYTE [DISTWIDTH] IN BLOOD BY AUTOMATED COUNT: 13.9 % (ref 11.7–13)
GFR SERPL CREATININE-BSD FRML MDRD: 88 ML/MIN/1.73
GLOBULIN UR ELPH-MCNC: 2.6 GM/DL
GLUCOSE BLD-MCNC: 90 MG/DL (ref 65–99)
GLUCOSE UR STRIP-MCNC: NEGATIVE MG/DL
HCG SERPL QL: NEGATIVE
HCT VFR BLD AUTO: 38.5 % (ref 35.6–45.5)
HGB BLD-MCNC: 12.3 G/DL (ref 11.9–15.5)
HGB UR QL STRIP.AUTO: NEGATIVE
HOLD SPECIMEN: NORMAL
HYALINE CASTS UR QL AUTO: ABNORMAL /LPF
IMM GRANULOCYTES # BLD: 0.03 10*3/MM3 (ref 0–0.03)
IMM GRANULOCYTES NFR BLD: 0.4 % (ref 0–0.5)
KETONES UR QL STRIP: NEGATIVE
LEUKOCYTE ESTERASE UR QL STRIP.AUTO: ABNORMAL
LIPASE SERPL-CCNC: 26 U/L (ref 13–60)
LYMPHOCYTES # BLD AUTO: 2.35 10*3/MM3 (ref 0.9–4.8)
LYMPHOCYTES NFR BLD AUTO: 29.8 % (ref 19.6–45.3)
MCH RBC QN AUTO: 29.4 PG (ref 26.9–32)
MCHC RBC AUTO-ENTMCNC: 31.9 G/DL (ref 32.4–36.3)
MCV RBC AUTO: 91.9 FL (ref 80.5–98.2)
MONOCYTES # BLD AUTO: 0.61 10*3/MM3 (ref 0.2–1.2)
MONOCYTES NFR BLD AUTO: 7.7 % (ref 5–12)
NEUTROPHILS # BLD AUTO: 4.51 10*3/MM3 (ref 1.9–8.1)
NEUTROPHILS NFR BLD AUTO: 57.2 % (ref 42.7–76)
NITRITE UR QL STRIP: NEGATIVE
PH UR STRIP.AUTO: 8 [PH] (ref 5–8)
PLATELET # BLD AUTO: 256 10*3/MM3 (ref 140–500)
PMV BLD AUTO: 10 FL (ref 6–12)
POTASSIUM BLD-SCNC: 4.5 MMOL/L (ref 3.5–5.2)
PROT SERPL-MCNC: 7 G/DL (ref 6–8.5)
PROT UR QL STRIP: NEGATIVE
RBC # BLD AUTO: 4.19 10*6/MM3 (ref 3.9–5.2)
RBC # UR: ABNORMAL /HPF
REF LAB TEST METHOD: ABNORMAL
SODIUM BLD-SCNC: 140 MMOL/L (ref 136–145)
SP GR UR STRIP: 1.01 (ref 1–1.03)
SQUAMOUS #/AREA URNS HPF: ABNORMAL /HPF
UROBILINOGEN UR QL STRIP: ABNORMAL
WBC NRBC COR # BLD: 7.89 10*3/MM3 (ref 4.5–10.7)
WBC UR QL AUTO: ABNORMAL /HPF
WHOLE BLOOD HOLD SPECIMEN: NORMAL
WHOLE BLOOD HOLD SPECIMEN: NORMAL

## 2017-09-18 PROCEDURE — 83690 ASSAY OF LIPASE: CPT | Performed by: EMERGENCY MEDICINE

## 2017-09-18 PROCEDURE — 36415 COLL VENOUS BLD VENIPUNCTURE: CPT | Performed by: EMERGENCY MEDICINE

## 2017-09-18 PROCEDURE — 84703 CHORIONIC GONADOTROPIN ASSAY: CPT | Performed by: EMERGENCY MEDICINE

## 2017-09-18 PROCEDURE — 87086 URINE CULTURE/COLONY COUNT: CPT | Performed by: EMERGENCY MEDICINE

## 2017-09-18 PROCEDURE — 99283 EMERGENCY DEPT VISIT LOW MDM: CPT

## 2017-09-18 PROCEDURE — 80053 COMPREHEN METABOLIC PANEL: CPT | Performed by: EMERGENCY MEDICINE

## 2017-09-18 PROCEDURE — 81001 URINALYSIS AUTO W/SCOPE: CPT | Performed by: EMERGENCY MEDICINE

## 2017-09-18 PROCEDURE — 85025 COMPLETE CBC W/AUTO DIFF WBC: CPT | Performed by: EMERGENCY MEDICINE

## 2017-09-18 RX ORDER — CEPHALEXIN 500 MG/1
500 CAPSULE ORAL 3 TIMES DAILY
Qty: 21 CAPSULE | Refills: 0 | Status: SHIPPED | OUTPATIENT
Start: 2017-09-18 | End: 2017-12-15

## 2017-09-18 RX ORDER — SODIUM CHLORIDE 0.9 % (FLUSH) 0.9 %
10 SYRINGE (ML) INJECTION AS NEEDED
Status: DISCONTINUED | OUTPATIENT
Start: 2017-09-18 | End: 2017-09-18 | Stop reason: HOSPADM

## 2017-09-18 RX ORDER — OXYCODONE HYDROCHLORIDE AND ACETAMINOPHEN 5; 325 MG/1; MG/1
1-2 TABLET ORAL EVERY 6 HOURS PRN
Qty: 10 TABLET | Refills: 0 | Status: SHIPPED | OUTPATIENT
Start: 2017-09-18 | End: 2017-11-24

## 2017-09-18 NOTE — ED PROVIDER NOTES
" EMERGENCY DEPARTMENT ENCOUNTER    CHIEF COMPLAINT  Chief Complaint: back pain  History given by: pt  History limited by: none  Room Number:   PMD: LUCIANA Vigil      HPI:  Pt is a 32 y.o. female who presents complaining of lower back pain (severe) which is associated with dysuria (burning urination) and onset about 2 weeks ago. She reports the pain had subsided for several days before recurring 2 days ago. Symptoms are not associated with a fever higher than 100 (at home temp was 99.3), headache, chest pain, SOA, vomiting or other sx at this time.     Her only reported history of abdominal surgery is a tubal ligation. She was recently diagnosed with a UTI and has been on macrobid.     Duration/Onset/Timin weeks/gradual/constant  Location: lower  Radiation: none stated  Quality: \"pain\"  Intensity/Severity: severe  Associated Symptoms: dysuria  Aggravating or Alleviating Factors: none stated  Previous Episodes: She was recently dx with a UTI and placed on microbid tx      PAST MEDICAL HISTORY  Active Ambulatory Problems     Diagnosis Date Noted   • Routine general medical examination at a health care facility 08/10/2016   • Chronic fatigue 08/10/2016   • Muscle ache 08/10/2016   • Anxiety 08/10/2016   • Diarrhea 2016   • Insomnia 2017   • Migraine headache 2017   • Memory loss 2017   • Acute bilateral low back pain without sciatica 2017     Resolved Ambulatory Problems     Diagnosis Date Noted   • Pap smear, as part of routine gynecological examination 2016     Past Medical History:   Diagnosis Date   • Depression    • GERD (gastroesophageal reflux disease)    • Hernia, hiatal    • Migraine    • Murmur, cardiac    • Ulcer of abdomen wall        PAST SURGICAL HISTORY  Past Surgical History:   Procedure Laterality Date   • ENDOSCOPY N/A 2016    Procedure: ESOPHAGOGASTRODUODENOSCOPY WITH BIOPSIES;  Surgeon: Tim Hunter MD;  Location: [facility] ENDOSCOPY;  " Service:    • EYE SURGERY Left 1986    STAUBISMIS   • LAPAROSCOPIC TUBAL LIGATION  2012       FAMILY HISTORY  Family History   Problem Relation Age of Onset   • Diabetes Mother    • Migraines Mother    • Dementia Mother    • Hypertension Mother    • Thyroid disease Mother    • Asthma Father    • Hyperlipidemia Father    • Hypertension Father    • Cervical cancer Maternal Grandmother    • Dementia Maternal Grandmother    • Uterine cancer Paternal Grandmother    • Dementia Paternal Grandmother    • Stroke Paternal Grandmother    • Hypertension Paternal Grandmother    • Heart disease Paternal Grandmother    • Diabetes Paternal Grandmother    • Thyroid disease Paternal Grandmother    • Kidney disease Paternal Grandmother    • Heart disease Maternal Grandfather    • Heart disease Paternal Grandfather        SOCIAL HISTORY  Social History     Social History   • Marital status: Single     Spouse name: N/A   • Number of children: N/A   • Years of education: N/A     Occupational History   • Not on file.     Social History Main Topics   • Smoking status: Never Smoker   • Smokeless tobacco: Never Used   • Alcohol use No   • Drug use: No   • Sexual activity: Defer     Other Topics Concern   • Not on file     Social History Narrative       ALLERGIES  Review of patient's allergies indicates no known allergies.    REVIEW OF SYSTEMS  Review of Systems   Constitutional: Negative for fever.   Respiratory: Negative for shortness of breath.    Cardiovascular: Negative for chest pain.   Gastrointestinal: Negative for vomiting.   Genitourinary: Positive for dysuria (burning urination]).   Musculoskeletal: Positive for back pain (lower).   Neurological: Negative for headaches.       PHYSICAL EXAM  ED Triage Vitals   Temp Heart Rate Resp BP SpO2   09/18/17 1718 09/18/17 1718 09/18/17 1718 09/18/17 1718 09/18/17 1718   98 °F (36.7 °C) 52 16 109/86 100 %      Temp src Heart Rate Source Patient Position BP Location FiO2 (%)   -- 09/18/17 1901  -- -- --    Monitor          Physical Exam   Constitutional: No distress.   HENT:   Head: Normocephalic and atraumatic.   Cardiovascular: Regular rhythm.    Pulmonary/Chest: No respiratory distress.   Abdominal: There is tenderness (LLQ).   Musculoskeletal: She exhibits no tenderness.   Skin: No rash noted.   Nursing note and vitals reviewed.      LAB RESULTS  Lab Results (last 24 hours)     Procedure Component Value Units Date/Time    CBC & Differential [[id number]] Collected:  09/18/17 1734    Specimen:  Blood Updated:  09/18/17 1746    Narrative:       The following orders were created for panel order CBC & Differential.  Procedure                               Abnormality         Status                     ---------                               -----------         ------                     CBC Auto Differential[[id number]]        Abnormal            Final result                 Please view results for these tests on the individual orders.    Comprehensive Metabolic Panel [[id number]] Collected:  09/18/17 1734    Specimen:  Blood Updated:  09/18/17 1811     Glucose 90 mg/dL      BUN 9 mg/dL      Creatinine 0.76 mg/dL      Sodium 140 mmol/L      Potassium 4.5 mmol/L      Chloride 101 mmol/L      CO2 28.9 mmol/L      Calcium 9.5 mg/dL      Total Protein 7.0 g/dL      Albumin 4.40 g/dL      ALT (SGPT) 11 U/L      AST (SGOT) 14 U/L      Alkaline Phosphatase 58 U/L      Total Bilirubin 0.6 mg/dL      eGFR Non African Amer 88 mL/min/1.73      Globulin 2.6 gm/dL      A/G Ratio 1.7 g/dL      BUN/Creatinine Ratio 11.8     Anion Gap 10.1 mmol/L     Lipase [[id number]]  (Normal) Collected:  09/18/17 1734    Specimen:  Blood Updated:  09/18/17 1811     Lipase 26 U/L     Urinalysis With / Culture If Indicated [[id number]]  (Abnormal) Collected:  09/18/17 1734    Specimen:  Urine from Urine, Clean Catch Updated:  09/18/17 1748     Color, UA Yellow     Appearance, UA Hazy (A)     pH, UA 8.0     Specific [place], UA 1.010      Glucose, UA Negative     Ketones, UA Negative     Bilirubin, UA Negative     Blood, UA Negative     Protein, UA Negative     Leuk Esterase, UA Moderate (2+) (A)     Nitrite, UA Negative     Urobilinogen, UA 0.2 E.U./dL    hCG, Serum, Qualitative [[id number]]  (Normal) Collected:  09/18/17 1734    Specimen:  Blood Updated:  09/18/17 1805     HCG Qualitative Negative    CBC Auto Differential [[id number]]  (Abnormal) Collected:  09/18/17 1734    Specimen:  Blood Updated:  09/18/17 1746     WBC 7.89 10*3/mm3      RBC 4.19 10*6/mm3      Hemoglobin 12.3 g/dL      Hematocrit 38.5 %      MCV 91.9 fL      MCH 29.4 pg      MCHC 31.9 (L) g/dL      RDW 13.9 (H) %      RDW-SD 46.4 fl      MPV 10.0 fL      Platelets 256 10*3/mm3      Neutrophil % 57.2 %      Lymphocyte % 29.8 %      Monocyte % 7.7 %      Eosinophil % 4.4 %      Basophil % 0.5 %      Immature Grans % 0.4 %      Neutrophils, Absolute 4.51 10*3/mm3      Lymphocytes, Absolute 2.35 10*3/mm3      Monocytes, Absolute 0.61 10*3/mm3      Eosinophils, Absolute 0.35 10*3/mm3      Basophils, Absolute 0.04 10*3/mm3      Immature Grans, Absolute 0.03 10*3/mm3     Urinalysis, Microscopic Only [[id number]]  (Abnormal) Collected:  09/18/17 1734    Specimen:  Urine from Urine, Clean Catch Updated:  09/18/17 1807     RBC, UA None Seen /HPF      WBC, UA 6-12 (A) /HPF      Bacteria, UA 2+ (A) /HPF      Squamous Epithelial Cells, UA 3-6 (A) /HPF      Hyaline Casts, UA None Seen /LPF      Methodology Manual Light Microscopy    Urine Culture [[id number]] Collected:  09/18/17 1734    Specimen:  Urine from Urine, Clean Catch Updated:  09/18/17 1747          I ordered the above labs and reviewed the results      PROCEDURES  Procedures      PROGRESS AND CONSULTS  ED Course   1721: Ordered CBC, UA, lipase, CMP for further evaluation of flank pain.     1919: Informed pt of her pertinent workup results which reveals possible early stage of pyelonephritis. I will d/c with percocet and keflex  medication for pain and infection. Pt understands and agrees with the plan. All questions answered.          MEDICAL DECISION MAKING  Results were reviewed/discussed with the patient and they were also made aware of online access. Pt also made aware that some labs, such as cultures, will not be resulted during ER visit and follow up with PMD is necessary.     MDM  Number of Diagnoses or Management Options     Amount and/or Complexity of Data Reviewed  Clinical lab tests: ordered and reviewed (UA is positive for WBC, bacteria and squamous epithelial cells)  Decide to obtain previous medical records or to obtain history from someone other than the patient: yes  Review and summarize past medical records: yes (Pt was recently dx with UTI and placed on macrobid)    Patient Progress  Patient progress: stable         DIAGNOSIS  Final diagnoses:   Pyelonephritis       DISPOSITION  DISCHARGE    Patient discharged in stable condition.    Reviewed implications of results, diagnosis, meds, responsibility to follow up, warning signs and symptoms of possible worsening, potential complications and reasons to return to ER, including new or worsening sx.    Patient/Family voiced understanding of above instructions.    Discussed plan for discharge, as there is no emergent indication for admission.  Pt/family is agreeable and understands need for follow up and repeat testing.  Pt is aware that discharge does not mean that nothing is wrong but it indicates no emergency is present that requires admission and they must continue care with follow-up as given below or physician of their choice.     FOLLOW-UP  No follow-up provider specified.       Medication List      Notice     No changes were made to your prescriptions during this visit.            Latest Documented Vital Signs:  As of 7:26 PM  BP- 125/87 HR- 60 Temp- 98 °F (36.7 °C) O2 sat- 99%    --  Documentation assistance provided by no Cruz for Dr. Winter.  Information  recorded by the scribe was done at my direction and has been verified and validated by me.     Alejandrina Cruz  09/18/17 1926       Dilan Winter MD  09/18/17 1927

## 2017-09-20 LAB — BACTERIA SPEC AEROBE CULT: NO GROWTH

## 2017-09-21 RX ORDER — ZOLPIDEM TARTRATE 10 MG/1
10 TABLET ORAL NIGHTLY PRN
Qty: 30 TABLET | Refills: 0 | OUTPATIENT
Start: 2017-09-21 | End: 2017-10-23 | Stop reason: SDUPTHER

## 2017-09-26 ENCOUNTER — TELEPHONE (OUTPATIENT)
Dept: SOCIAL WORK | Facility: HOSPITAL | Age: 32
End: 2017-09-26

## 2017-10-03 ENCOUNTER — OFFICE VISIT (OUTPATIENT)
Dept: ORTHOPEDIC SURGERY | Facility: CLINIC | Age: 32
End: 2017-10-03

## 2017-10-03 DIAGNOSIS — M54.2 SPINE PAIN, CERVICAL: Primary | ICD-10-CM

## 2017-10-03 DIAGNOSIS — G89.29 CHRONIC LOW BACK PAIN, UNSPECIFIED BACK PAIN LATERALITY, WITH SCIATICA PRESENCE UNSPECIFIED: ICD-10-CM

## 2017-10-03 DIAGNOSIS — M54.5 CHRONIC LOW BACK PAIN, UNSPECIFIED BACK PAIN LATERALITY, WITH SCIATICA PRESENCE UNSPECIFIED: ICD-10-CM

## 2017-10-03 DIAGNOSIS — M54.2 NECK PAIN: ICD-10-CM

## 2017-10-03 PROCEDURE — 99203 OFFICE O/P NEW LOW 30 MIN: CPT | Performed by: ORTHOPAEDIC SURGERY

## 2017-10-03 PROCEDURE — 72040 X-RAY EXAM NECK SPINE 2-3 VW: CPT | Performed by: ORTHOPAEDIC SURGERY

## 2017-10-03 NOTE — PROGRESS NOTES
New patient or new problem visit    Chief Complaint   Patient presents with   • Cervical Spine - Follow-up, Pain       HPI: She complains of neck pain and occipital headache sometimes radiating to the left shoulder.  Occasional back pain radiating to the left leg.  She saw a neurologist who ordered an MRI of the brain that was apparently normal.  She's tried muscle relaxants which have helped somewhat for the severe constant crushing grinding stabbing aching burning pain.    PFSH: See chart- reviewed    Review of Systems   Constitutional: Positive for unexpected weight change. Negative for chills, diaphoresis and fever.   HENT: Positive for sinus pressure. Negative for hearing loss, nosebleeds, sore throat and tinnitus.    Eyes: Negative.  Negative for pain and visual disturbance.   Respiratory: Negative.  Negative for cough, shortness of breath and wheezing.    Cardiovascular: Positive for palpitations. Negative for chest pain.   Gastrointestinal: Positive for diarrhea and nausea. Negative for abdominal pain and vomiting.   Endocrine: Negative.  Negative for cold intolerance, heat intolerance and polydipsia.   Genitourinary: Positive for pelvic pain. Negative for difficulty urinating, dysuria and hematuria.   Musculoskeletal: Positive for back pain, gait problem, neck pain and neck stiffness. Negative for arthralgias, joint swelling and myalgias.   Skin: Negative.  Negative for rash and wound.   Allergic/Immunologic: Negative.  Negative for environmental allergies.   Neurological: Positive for numbness and headaches. Negative for dizziness and syncope.   Psychiatric/Behavioral: Positive for sleep disturbance. Negative for dysphoric mood. The patient is nervous/anxious.        PE: Constitutional: Vital signs above-noted.  Awake, alert and oriented    Psychiatric: Affect and insight do not appear grossly disturbed.    Pulmonary: Breathing is unlabored, color is good.    Skin: Warm, dry and normal turgor    Cardiac:   radial pulses intact.  No arm edema.    Eyesight and hearing appear adequate for examination purposes    Musculoskeletal:  Posture is unremarkable to coronal and sagittal inspection.  Motion appears undisturbed.  The skin about the area is intact.  There is no palpable or visible deformity.  There is no local spasm. There is some tenderness to percussion and palpation of the cervical spine.     Neurologic:  In the bilateral upper extremities there is no evidence of atrophy.  Motor function is undisturbed in shoulder abduction, elbow flexion, wrist extension, finger extension, triceps extension, or finger abduction   .  Sensation appears symmetrically intact to light touch   .  Reflexes are 2+ and symmetrical in the biceps, brachioradialis, and triceps. Levine test is negative.  Gait appears undisturbed.  Spurling test is negative.  Lower extremity reflexes are intact.      MEDICAL DECISION MAKING    XRAY: Plain film x-rays 2 views of the cervical spine obtained in my office today show an asthenic, hypolordotic cervical spine which is otherwise unremarkable.  MRI of the brain showed a couple of cuts through the upper 2 or 3 cervical vertebra which look unremarkable.  I reviewed the radiologist's report and the brain was essentially normal.    Other: n/a    Impression: Cervicalgia    Plan: Disc with therapy and traction follow-up when necessary if no better we'll consider MRI scan of the cervical spine.

## 2017-10-20 ENCOUNTER — TELEPHONE (OUTPATIENT)
Dept: FAMILY MEDICINE CLINIC | Facility: CLINIC | Age: 32
End: 2017-10-20

## 2017-10-23 RX ORDER — ZOLPIDEM TARTRATE 10 MG/1
10 TABLET ORAL NIGHTLY PRN
Qty: 30 TABLET | Refills: 0 | OUTPATIENT
Start: 2017-10-23 | End: 2017-12-15 | Stop reason: SDUPTHER

## 2017-11-01 DIAGNOSIS — F41.9 ANXIETY: ICD-10-CM

## 2017-11-24 ENCOUNTER — HOSPITAL ENCOUNTER (EMERGENCY)
Facility: HOSPITAL | Age: 32
Discharge: HOME | End: 2017-11-25
Attending: EMERGENCY MEDICINE | Admitting: EMERGENCY MEDICINE

## 2017-11-24 DIAGNOSIS — R09.1 PLEURISY: ICD-10-CM

## 2017-11-24 DIAGNOSIS — R07.89 ATYPICAL CHEST PAIN: Primary | ICD-10-CM

## 2017-11-24 LAB
ALBUMIN SERPL-MCNC: 3.9 G/DL (ref 3.5–5.2)
ALBUMIN/GLOB SERPL: 1.2 G/DL
ALP SERPL-CCNC: 61 U/L (ref 39–117)
ALT SERPL W P-5'-P-CCNC: 9 U/L (ref 1–33)
ANION GAP SERPL CALCULATED.3IONS-SCNC: 11.8 MMOL/L
AST SERPL-CCNC: 12 U/L (ref 1–32)
BASOPHILS # BLD AUTO: 0.07 10*3/MM3 (ref 0–0.2)
BASOPHILS NFR BLD AUTO: 0.7 % (ref 0–1.5)
BILIRUB SERPL-MCNC: 0.2 MG/DL (ref 0.1–1.2)
BUN BLD-MCNC: 13 MG/DL (ref 6–20)
BUN/CREAT SERPL: 17.8 (ref 7–25)
CALCIUM SPEC-SCNC: 9 MG/DL (ref 8.6–10.5)
CHLORIDE SERPL-SCNC: 102 MMOL/L (ref 98–107)
CO2 SERPL-SCNC: 25.2 MMOL/L (ref 22–29)
CREAT BLD-MCNC: 0.73 MG/DL (ref 0.57–1)
DEPRECATED RDW RBC AUTO: 46.4 FL (ref 37–54)
EOSINOPHIL # BLD AUTO: 0.26 10*3/MM3 (ref 0–0.7)
EOSINOPHIL NFR BLD AUTO: 2.8 % (ref 0.3–6.2)
ERYTHROCYTE [DISTWIDTH] IN BLOOD BY AUTOMATED COUNT: 14.1 % (ref 11.7–13)
GFR SERPL CREATININE-BSD FRML MDRD: 92 ML/MIN/1.73
GLOBULIN UR ELPH-MCNC: 3.3 GM/DL
GLUCOSE BLD-MCNC: 86 MG/DL (ref 65–99)
HCT VFR BLD AUTO: 36.7 % (ref 35.6–45.5)
HGB BLD-MCNC: 12.1 G/DL (ref 11.9–15.5)
IMM GRANULOCYTES # BLD: 0.04 10*3/MM3 (ref 0–0.03)
IMM GRANULOCYTES NFR BLD: 0.4 % (ref 0–0.5)
LYMPHOCYTES # BLD AUTO: 2.67 10*3/MM3 (ref 0.9–4.8)
LYMPHOCYTES NFR BLD AUTO: 28.5 % (ref 19.6–45.3)
MCH RBC QN AUTO: 29.7 PG (ref 26.9–32)
MCHC RBC AUTO-ENTMCNC: 33 G/DL (ref 32.4–36.3)
MCV RBC AUTO: 90.2 FL (ref 80.5–98.2)
MONOCYTES # BLD AUTO: 0.73 10*3/MM3 (ref 0.2–1.2)
MONOCYTES NFR BLD AUTO: 7.8 % (ref 5–12)
NEUTROPHILS # BLD AUTO: 5.59 10*3/MM3 (ref 1.9–8.1)
NEUTROPHILS NFR BLD AUTO: 59.8 % (ref 42.7–76)
PLATELET # BLD AUTO: 276 10*3/MM3 (ref 140–500)
PMV BLD AUTO: 10 FL (ref 6–12)
POTASSIUM BLD-SCNC: 4 MMOL/L (ref 3.5–5.2)
PROT SERPL-MCNC: 7.2 G/DL (ref 6–8.5)
RBC # BLD AUTO: 4.07 10*6/MM3 (ref 3.9–5.2)
SODIUM BLD-SCNC: 139 MMOL/L (ref 136–145)
TROPONIN T SERPL-MCNC: <0.01 NG/ML (ref 0–0.03)
WBC NRBC COR # BLD: 9.36 10*3/MM3 (ref 4.5–10.7)

## 2017-11-24 PROCEDURE — 99284 EMERGENCY DEPT VISIT MOD MDM: CPT

## 2017-11-24 PROCEDURE — 93005 ELECTROCARDIOGRAM TRACING: CPT

## 2017-11-24 PROCEDURE — 36415 COLL VENOUS BLD VENIPUNCTURE: CPT

## 2017-11-24 PROCEDURE — 84484 ASSAY OF TROPONIN QUANT: CPT | Performed by: PHYSICIAN ASSISTANT

## 2017-11-24 PROCEDURE — 96372 THER/PROPH/DIAG INJ SC/IM: CPT

## 2017-11-24 PROCEDURE — 85025 COMPLETE CBC W/AUTO DIFF WBC: CPT | Performed by: PHYSICIAN ASSISTANT

## 2017-11-24 PROCEDURE — 80053 COMPREHEN METABOLIC PANEL: CPT | Performed by: PHYSICIAN ASSISTANT

## 2017-11-24 PROCEDURE — 93010 ELECTROCARDIOGRAM REPORT: CPT | Performed by: INTERNAL MEDICINE

## 2017-11-25 ENCOUNTER — APPOINTMENT (OUTPATIENT)
Dept: GENERAL RADIOLOGY | Facility: HOSPITAL | Age: 32
End: 2017-11-25

## 2017-11-25 VITALS
RESPIRATION RATE: 16 BRPM | OXYGEN SATURATION: 100 % | HEART RATE: 62 BPM | BODY MASS INDEX: 19.16 KG/M2 | TEMPERATURE: 99.3 F | HEIGHT: 65 IN | DIASTOLIC BLOOD PRESSURE: 86 MMHG | SYSTOLIC BLOOD PRESSURE: 120 MMHG | WEIGHT: 115 LBS

## 2017-11-25 PROCEDURE — 25010000002 KETOROLAC TROMETHAMINE PER 15 MG: Performed by: EMERGENCY MEDICINE

## 2017-11-25 PROCEDURE — 96372 THER/PROPH/DIAG INJ SC/IM: CPT

## 2017-11-25 PROCEDURE — 71020 HC CHEST PA AND LATERAL: CPT

## 2017-11-25 RX ORDER — KETOROLAC TROMETHAMINE 30 MG/ML
30 INJECTION, SOLUTION INTRAMUSCULAR; INTRAVENOUS ONCE
Status: COMPLETED | OUTPATIENT
Start: 2017-11-25 | End: 2017-11-25

## 2017-11-25 RX ORDER — NAPROXEN 500 MG/1
500 TABLET ORAL 2 TIMES DAILY WITH MEALS
Qty: 10 TABLET | Refills: 0 | OUTPATIENT
Start: 2017-11-25 | End: 2019-10-18

## 2017-11-25 RX ADMIN — KETOROLAC TROMETHAMINE 30 MG: 30 INJECTION, SOLUTION INTRAMUSCULAR at 00:40

## 2017-11-25 NOTE — ED PROVIDER NOTES
EMERGENCY DEPARTMENT ENCOUNTER    CHIEF COMPLAINT  Chief Complaint: Chest pain  History given by: patient   History limited by: n/a  Room Number: 15/15  PMD: LUCIANA Vigil      HPI:  Pt is a 32 y.o. female who presents complaining of chest pain that has been ongoing for a few days, but worsening tonight. Pt describes the pain as a heaviness, and states that she fell a sharp pain that lasted for about 15 seconds tonight. Pt states that the pain is worsened by deep breathing, but denies SOA. She denies pain or swelling in her legs.     Duration:  3 days   Onset: gradual  Timing: constant   Location: mid sternal   Radiation: none  Quality: pressure  Intensity/Severity: moderate  Associated Symptoms: none  Aggravating Factors: inspiration  Alleviating Factors: none  Previous Episodes: none  Treatment before arrival: none    PAST MEDICAL HISTORY  Active Ambulatory Problems     Diagnosis Date Noted   • Routine general medical examination at a health care facility 08/10/2016   • Chronic fatigue 08/10/2016   • Muscle ache 08/10/2016   • Anxiety 08/10/2016   • Diarrhea 09/22/2016   • Insomnia 02/08/2017   • Migraine headache 02/27/2017   • Memory loss 08/03/2017   • Acute bilateral low back pain without sciatica 08/11/2017     Resolved Ambulatory Problems     Diagnosis Date Noted   • Pap smear, as part of routine gynecological examination 09/22/2016     Past Medical History:   Diagnosis Date   • Depression    • GERD (gastroesophageal reflux disease)    • Hernia, hiatal    • Migraine    • Murmur, cardiac    • Ulcer of abdomen wall 2007       PAST SURGICAL HISTORY  Past Surgical History:   Procedure Laterality Date   • DENTAL PROCEDURE     • ENDOSCOPY N/A 6/22/2016    Procedure: ESOPHAGOGASTRODUODENOSCOPY WITH BIOPSIES;  Surgeon: Tim Hunter MD;  Location: [facility] ENDOSCOPY;  Service:    • EYE SURGERY Left 1986    STAUBISMIS   • LAPAROSCOPIC TUBAL LIGATION  2012       FAMILY HISTORY  Family History   Problem  Relation Age of Onset   • Diabetes Mother    • Migraines Mother    • Dementia Mother    • Hypertension Mother    • Thyroid disease Mother    • Asthma Father    • Hyperlipidemia Father    • Hypertension Father    • Cervical cancer Maternal Grandmother    • Dementia Maternal Grandmother    • Uterine cancer Paternal Grandmother    • Dementia Paternal Grandmother    • Stroke Paternal Grandmother    • Hypertension Paternal Grandmother    • Heart disease Paternal Grandmother    • Diabetes Paternal Grandmother    • Thyroid disease Paternal Grandmother    • Kidney disease Paternal Grandmother    • Heart disease Maternal Grandfather    • Heart disease Paternal Grandfather        SOCIAL HISTORY  Social History     Social History   • Marital status:      Spouse name: N/A   • Number of children: N/A   • Years of education: N/A     Occupational History   • Not on file.     Social History Main Topics   • Smoking status: Never Smoker   • Smokeless tobacco: Never Used   • Alcohol use Yes      Comment: occ   • Drug use: No   • Sexual activity: Defer     Other Topics Concern   • Not on file     Social History Narrative   • No narrative on file       ALLERGIES  Review of patient's allergies indicates no known allergies.    REVIEW OF SYSTEMS  Review of Systems   Constitutional: Negative for chills and fever.   HENT: Negative for congestion and sore throat.    Eyes: Negative.    Respiratory: Negative for cough and shortness of breath.    Cardiovascular: Positive for chest pain. Negative for leg swelling.   Gastrointestinal: Negative for abdominal pain, diarrhea and vomiting.   Genitourinary: Negative for difficulty urinating and dysuria.   Musculoskeletal: Negative for back pain and neck pain.   Skin: Negative for rash and wound.   Allergic/Immunologic: Negative.    Neurological: Negative for dizziness, weakness, numbness and headaches.   Psychiatric/Behavioral: Negative.    All other systems reviewed and are  negative.      PHYSICAL EXAM  ED Triage Vitals   Temp Heart Rate Resp BP SpO2   11/24/17 2228 11/24/17 2228 11/24/17 2228 11/24/17 2241 11/24/17 2228   99.3 °F (37.4 °C) 76 16 113/80 100 %      Temp src Heart Rate Source Patient Position BP Location FiO2 (%)   -- -- 11/24/17 2241 11/24/17 2241 --     Sitting Right arm        Physical Exam   Constitutional: She is oriented to person, place, and time and well-developed, well-nourished, and in no distress. No distress.   HENT:   Head: Normocephalic and atraumatic.   Eyes: EOM are normal. Pupils are equal, round, and reactive to light.   Neck: Normal range of motion. Neck supple.   Cardiovascular: Normal rate, regular rhythm and normal heart sounds.    Pulmonary/Chest: Effort normal and breath sounds normal. No respiratory distress.   Abdominal: Soft. There is no tenderness. There is no rebound and no guarding.   Musculoskeletal: Normal range of motion. She exhibits no edema.   Neurological: She is alert and oriented to person, place, and time.   Skin: Skin is warm and dry. No rash noted.   Psychiatric: Mood and affect normal.   Nursing note and vitals reviewed.      LAB RESULTS  Lab Results (last 24 hours)     Procedure Component Value Units Date/Time    CBC & Differential [[id number]] Collected:  11/24/17 2251    Specimen:  Blood Updated:  11/24/17 2257    Narrative:       The following orders were created for panel order CBC & Differential.  Procedure                               Abnormality         Status                     ---------                               -----------         ------                     CBC Auto Differential[[id number]]        Abnormal            Final result                 Please view results for these tests on the individual orders.    Comprehensive Metabolic Panel [[id number]] Collected:  11/24/17 2251    Specimen:  Blood Updated:  11/24/17 2317     Glucose 86 mg/dL      BUN 13 mg/dL      Creatinine 0.73 mg/dL      Sodium 139 mmol/L       Potassium 4.0 mmol/L      Chloride 102 mmol/L      CO2 25.2 mmol/L      Calcium 9.0 mg/dL      Total Protein 7.2 g/dL      Albumin 3.90 g/dL      ALT (SGPT) 9 U/L      AST (SGOT) 12 U/L      Alkaline Phosphatase 61 U/L      Total Bilirubin 0.2 mg/dL      eGFR Non African Amer 92 mL/min/1.73      Globulin 3.3 gm/dL      A/G Ratio 1.2 g/dL      BUN/Creatinine Ratio 17.8     Anion Gap 11.8 mmol/L     Troponin [[id number]]  (Normal) Collected:  11/24/17 2251    Specimen:  Blood Updated:  11/24/17 2317     Troponin T <0.010 ng/mL     Narrative:       Troponin T Reference Ranges:  Less than 0.03 ng/mL:    Negative for AMI  0.03 to 0.09 ng/mL:      Indeterminant for AMI  Greater than 0.09 ng/mL: Positive for AMI    CBC Auto Differential [[id number]]  (Abnormal) Collected:  11/24/17 2251    Specimen:  Blood Updated:  11/24/17 2257     WBC 9.36 10*3/mm3      RBC 4.07 10*6/mm3      Hemoglobin 12.1 g/dL      Hematocrit 36.7 %      MCV 90.2 fL      MCH 29.7 pg      MCHC 33.0 g/dL      RDW 14.1 (H) %      RDW-SD 46.4 fl      MPV 10.0 fL      Platelets 276 10*3/mm3      Neutrophil % 59.8 %      Lymphocyte % 28.5 %      Monocyte % 7.8 %      Eosinophil % 2.8 %      Basophil % 0.7 %      Immature Grans % 0.4 %      Neutrophils, Absolute 5.59 10*3/mm3      Lymphocytes, Absolute 2.67 10*3/mm3      Monocytes, Absolute 0.73 10*3/mm3      Eosinophils, Absolute 0.26 10*3/mm3      Basophils, Absolute 0.07 10*3/mm3      Immature Grans, Absolute 0.04 (H) 10*3/mm3           I ordered the above labs and reviewed the results    RADIOLOGY  XR Chest 2 View   Final Result   1. No active disease.       This report was finalized on 11/25/2017 12:29 AM by Vladimir Deras MD.               I ordered the above noted radiological studies. Interpreted by radiologist. Reviewed by me in PACS.       PROCEDURES  Procedures    EKG           EKG time: 22:32  Rhythm/Rate: NSR 64  P waves and LA: nml  QRS, axis: nml   ST and T waves: nml     Interpreted  Contemporaneously by me, independently viewed  No prior    PROGRESS AND CONSULTS  ED Course     22:39  Troponin, CMP, CBC, and EKG ordered.     23;59  BP- 129/92 HR- 59 Temp- 99.3 °F (37.4 °C) O2 sat- 100%  Advised pt that the EKG and labs show NAD. Plan for CXR. Will treat pain. Pt understands and agrees with the plan, all questions answered.    00:05  CXR ordered. Toradol ordered to treat pain.     01;33  BP- 123/84 HR- 60 Temp- 99.3 °F (37.4 °C) O2 sat- 100%  Rechecked the patient who is in NAD and is resting comfortably. Advised pt that the CXR shows NAD. Plan to treat with antiinflammatories. Pt will be discharged. Pt understands and agrees with the plan, all questions answered.    MEDICAL DECISION MAKING  Results were reviewed/discussed with the patient and they were also made aware of online access. Pt also made aware that some labs, such as cultures, will not be resulted during ER visit and follow up with PMD is necessary.     MDM  Number of Diagnoses or Management Options     Amount and/or Complexity of Data Reviewed  Clinical lab tests: ordered and reviewed (Troponin is <0.010)  Tests in the radiology section of CPT®: ordered and reviewed (CXR shows NAD)  Tests in the medicine section of CPT®: ordered and reviewed (See EKG procedure note. )    Patient Progress  Patient progress: stable         DIAGNOSIS  Final diagnoses:   Atypical chest pain   Pleurisy       DISPOSITION  DISCHARGE    Patient discharged in stable condition.    Reviewed implications of results, diagnosis, meds, responsibility to follow up, warning signs and symptoms of possible worsening, potential complications and reasons to return to ER,.    Patient/Family voiced understanding of above instructions.    Discussed plan for discharge, as there is no emergent indication for admission.  Pt/family is agreeable and understands need for follow up and repeat testing.  Pt is aware that discharge does not mean that nothing is wrong but it indicates  no emergency is present that requires admission and they must continue care with follow-up as given below or physician of their choice.     FOLLOW-UP  Sohailariel Mckinney, APRN  [address] [facility] [id number] [phone]    Schedule an appointment as soon as possible for a visit           Medication List      New Prescriptions          naproxen 500 MG tablet   Commonly known as:  NAPROSYN   Take 1 tablet by mouth 2 (Two) Times a Day With Meals.         Stop          cephalexin 500 MG capsule   Commonly known as:  KEFLEX       zolpidem 10 MG tablet   Commonly known as:  AMBIEN             Latest Documented Vital Signs:  As of 3:20 AM  BP- 120/86 HR- 62 Temp- 99.3 °F (37.4 °C) O2 sat- 100%    --  Documentation assistance provided by no Patel for Dr Albarran.  Information recorded by the scribe was done at my direction and has been verified and validated by me.       Cassie Patel  11/25/17 0144       Roger Albarran MD  11/25/17 6836

## 2017-11-25 NOTE — ED NOTES
"Patient states \"I'm having a hard time breathing and I'm having chest pains. It started a few hours ago.\"     Carol Cuevas RN  11/24/17 2228    "

## 2017-11-27 ENCOUNTER — TELEPHONE (OUTPATIENT)
Dept: SOCIAL WORK | Facility: HOSPITAL | Age: 32
End: 2017-11-27

## 2017-12-15 ENCOUNTER — OFFICE VISIT (OUTPATIENT)
Dept: FAMILY MEDICINE CLINIC | Facility: CLINIC | Age: 32
End: 2017-12-15

## 2017-12-15 VITALS
TEMPERATURE: 98.1 F | OXYGEN SATURATION: 98 % | HEIGHT: 65 IN | BODY MASS INDEX: 19.99 KG/M2 | DIASTOLIC BLOOD PRESSURE: 68 MMHG | WEIGHT: 120 LBS | HEART RATE: 67 BPM | SYSTOLIC BLOOD PRESSURE: 108 MMHG

## 2017-12-15 DIAGNOSIS — K21.9 GASTROESOPHAGEAL REFLUX DISEASE WITHOUT ESOPHAGITIS: ICD-10-CM

## 2017-12-15 DIAGNOSIS — G47.00 INSOMNIA, UNSPECIFIED TYPE: ICD-10-CM

## 2017-12-15 DIAGNOSIS — R07.9 CHEST PAIN, UNSPECIFIED TYPE: Primary | ICD-10-CM

## 2017-12-15 PROCEDURE — 99214 OFFICE O/P EST MOD 30 MIN: CPT | Performed by: NURSE PRACTITIONER

## 2017-12-15 RX ORDER — OMEPRAZOLE 20 MG/1
20 CAPSULE, DELAYED RELEASE ORAL DAILY
Qty: 30 CAPSULE | Refills: 6 | OUTPATIENT
Start: 2017-12-15 | End: 2019-10-18

## 2017-12-15 RX ORDER — ZOLPIDEM TARTRATE 10 MG/1
10 TABLET ORAL NIGHTLY PRN
Qty: 90 TABLET | Refills: 0 | OUTPATIENT
Start: 2017-12-15 | End: 2019-10-18

## 2017-12-15 NOTE — PROGRESS NOTES
Subjective   Peggy Suero is a 32 y.o. female.     History of Present Illness   Patient presenting to the office today to follow up going to the emergency room for atypical chest pain.  He went to the emergency room on November 24 complaining of chest pain and an ongoing for a few days but had gotten worse at night.  She's not having shortness of breath she not have any headaches nausea or pain.  All lab work was normal chest x-ray was normal EKG was normal she was discharged home with a diagnosis of pleurisy and given naproxen.  Patient states the pain has continued to substernal to the left sometimes it wraps around the bottom of her breast.  She is eating normally she does not have a cough she states it does hurt worse when she exerts herself at times.  This morning she had a run her son to the bus and it started to hurt worse.  In the past she has been diagnosed with acid reflux is not taking any medication for this.  She had an upper scope in 2016 with a GI doctor diagnosed her with heartburn and told her to continue her Detrol on it she's not taking any type of PPI for a long time.    Peggy Suero 32 y.o. female presents for follow up of insomnia with onset of symptoms years ago. Patient describes symptoms as difficulty falling asleep. Patient has found complete relief with prescription sleep aid, Ambien. Associated symptoms include: fatigue if unable to take Rx . Patient denies daytime somnolence Symptoms have stabilized.  The patient has failed multiple OTC medications for insomnia.  They are well controlled on current Rx and will continue to try to take Rx PRN.  She will use the lowest effective dose.  The patient has read and signed the [facility] Controlled Substance Contract.  I will continue to see patient for regular follow up appointments and be prescribed the lowest effective dose.  MICHELE has been reviewed by me and is updated every 3 months. The patient is aware of the potential for  "addiction and dependence.  She denies that Ambien cause excessive daytime drowsiness and sleep walking.      The following portions of the patient's history were reviewed and updated as appropriate: allergies, current medications, past social history and problem list.    Review of Systems   Cardiovascular: Positive for chest pain.   All other systems reviewed and are negative.      Objective   /68 (BP Location: Left arm, Patient Position: Sitting)  Pulse 67  Temp 98.1 °F (36.7 °C) (Oral)   Ht 165.1 cm (65\")  Wt 54.4 kg (120 lb)  SpO2 98%  BMI 19.97 kg/m2  Physical Exam   Constitutional: She is oriented to person, place, and time. Vital signs are normal. She appears well-developed and well-nourished. No distress.   HENT:   Head: Normocephalic.   Cardiovascular: Normal rate, regular rhythm and normal heart sounds.    Pulmonary/Chest: Effort normal and breath sounds normal.   Neurological: She is alert and oriented to person, place, and time. Gait normal.   Psychiatric: She has a normal mood and affect. Her behavior is normal. Judgment and thought content normal.   Vitals reviewed.      Assessment/Plan   Problem List Items Addressed This Visit        Digestive    Gastroesophageal reflux disease without esophagitis    Relevant Medications    omeprazole (PRILOSEC) 20 MG capsule       Nervous and Auditory    Chest pain - Primary       Other    Insomnia    Relevant Medications    zolpidem (AMBIEN) 10 MG tablet        Considering her history of acid reflux and not being on a medication to try a trial Prilosec 20 mg for 4 weeks.  She's return the office at that time.  If things get worse she is to return the office before then.  Patient understands and verbalizes understanding of treatment and agrees.     rto in 3 mons   "

## 2020-04-14 ENCOUNTER — TELEMEDICINE (OUTPATIENT)
Dept: FAMILY MEDICINE CLINIC | Facility: TELEHEALTH | Age: 35
End: 2020-04-14

## 2020-04-14 DIAGNOSIS — Z71.89 ADVICE GIVEN ABOUT COVID-19 VIRUS INFECTION: ICD-10-CM

## 2020-04-14 DIAGNOSIS — R50.9 FEVER, UNSPECIFIED FEVER CAUSE: Primary | ICD-10-CM

## 2020-04-14 PROCEDURE — 99213 OFFICE O/P EST LOW 20 MIN: CPT | Performed by: NURSE PRACTITIONER

## 2020-04-14 NOTE — PROGRESS NOTES
"Fever      Subjective   Peggy Suero is a 35 y.o. female.     Fever    This is a new problem. Episode onset: 1 week ago. The problem occurs intermittently. The problem has been gradually worsening. Maximum temperature: . Pertinent negatives include no chest pain, congestion, coughing, ear pain, headaches, muscle aches, nausea, sore throat, urinary pain, vomiting or wheezing. She has tried nothing for the symptoms.   Risk factors: no contaminated food, no contaminated water, no immunosuppression, no occupational exposure, no recent sickness, no recent travel and no sick contacts       Patient reports onset of low grade fever (<100.4) starting last week and lasting \"off and on\" for the week. Today she states her temperature was \"nearly 101\". She states she has fatigue and an occasional runny nose but no other symptoms. She has been working from home the past week and denies travel or any known exposure to COVID-19, a PUI or patient that has tested positive. She states she needs \"some sort of paper for work stating she has talked to a medical professional\". She is currently continuing to work from home. She states she has taken nothing for her fever.  Patient Active Problem List   Diagnosis   • Routine general medical examination at a health care facility   • Chronic fatigue   • Muscle ache   • Anxiety   • Diarrhea   • Insomnia   • Migraine headache   • Memory loss   • Acute bilateral low back pain without sciatica   • Chest pain   • Gastroesophageal reflux disease without esophagitis       No Known Allergies     Current Outpatient Medications on File Prior to Visit   Medication Sig Dispense Refill   • B Complex Vitamins (VITAMIN B COMPLEX PO) Take  by mouth.     • [DISCONTINUED] azithromycin (ZITHROMAX) 250 MG tablet Take 2 tablets the first day, then 1 tablet daily for 4 days. 6 tablet 0   • [DISCONTINUED] ibuprofen (ADVIL,MOTRIN) 200 MG tablet Take 200 mg by mouth Every 6 (Six) Hours As Needed for Mild " Pain .       No current facility-administered medications on file prior to visit.        Past Medical History:   Diagnosis Date   • Depression    • GERD (gastroesophageal reflux disease)    • Hernia, hiatal    • Migraine    • Murmur, cardiac    • Ulcer of abdomen wall (CMS/HCC) 2007       Past Surgical History:   Procedure Laterality Date   • DENTAL PROCEDURE     • ENDOSCOPY N/A 6/22/2016    Procedure: ESOPHAGOGASTRODUODENOSCOPY WITH BIOPSIES;  Surgeon: Tim Hunter MD;  Location: [facility] ENDOSCOPY;  Service:    • EYE SURGERY Left 1986    STAUBISMIS   • LAPAROSCOPIC TUBAL LIGATION  2012       Family History   Problem Relation Age of Onset   • Diabetes Mother    • Migraines Mother    • Dementia Mother    • Hypertension Mother    • Thyroid disease Mother    • Asthma Father    • Hyperlipidemia Father    • Hypertension Father    • Cervical cancer Maternal Grandmother    • Dementia Maternal Grandmother    • Uterine cancer Paternal Grandmother    • Dementia Paternal Grandmother    • Stroke Paternal Grandmother    • Hypertension Paternal Grandmother    • Heart disease Paternal Grandmother    • Diabetes Paternal Grandmother    • Thyroid disease Paternal Grandmother    • Kidney disease Paternal Grandmother    • Heart disease Maternal Grandfather    • Heart disease Paternal Grandfather        Social History     Socioeconomic History   • Marital status:      Spouse name: Not on file   • Number of children: Not on file   • Years of education: Not on file   • Highest education level: Not on file   Tobacco Use   • Smoking status: Never Smoker   • Smokeless tobacco: Never Used   Substance and Sexual Activity   • Alcohol use: Yes     Comment: occ   • Drug use: No   • Sexual activity: Defer       Travel:  No recent travel within the last 21 days outside the [place] Denies recent travel to one of the following [place]ries:  [place], [place], [place], or [place].  Denies contact with anyone who has traveled to  one of the following [place]  Countries: [place], [place], [place], or [place] within the last 21 days and is known or suspected to have Ebola.  Denies having had any contact with the human remains, blood or any bodily fluids of someone who is known or suspected to have Ebola within the last 21 days.     OB History    None         Review of Systems   Constitutional: Positive for fatigue and fever. Negative for activity change, appetite change and chills.   HENT: Positive for rhinorrhea (intermittent). Negative for congestion, ear pain and sore throat.    Respiratory: Negative for cough, chest tightness, shortness of breath, wheezing and stridor.    Cardiovascular: Negative for chest pain.   Gastrointestinal: Negative for nausea and vomiting.   Genitourinary: Negative for dysuria.   Neurological: Negative for headaches.   All other systems reviewed and are negative.      There were no vitals taken for this visit.    Objective   Physical Exam   Constitutional: She is oriented to person, place, and time. She appears well-developed and well-nourished.   HENT:   Head: Normocephalic and atraumatic.   Pulmonary/Chest: Effort normal. No stridor. No respiratory distress.   No cough noted.    Neurological: She is alert and oriented to person, place, and time.   Psychiatric: She has a normal mood and affect. Her behavior is normal.   *limited physical exam via video visit    Assessment/Plan   Peggy was seen today for fever.    Diagnoses and all orders for this visit:    Fever, unspecified fever cause    Advice Given About Covid-19 Virus Infection  -     QUESTIONNAIRE SERIES    Rest, increase fluids, alternate Tylenol and Motrin for fever/pain. As you have already had low grade fever for one week, it is recommended that you self quarantine for another week and instructions on how to do so are included. An excuse for 7 days will be provided for your employer. Should you develop new or worsening symptoms, you should seek  evaluation at the nearest Urgent Care or ER. Patient verbalized understanding.    Video visit time spent on this patient approx. 20 mintues    Return if symptoms worsen or fail to improve.

## 2020-04-14 NOTE — PATIENT INSTRUCTIONS
Rest, increase fluids, alternate Tylenol and Motrin for fever/pain. As you have already had low grade fever for one week, it is recommended that you self quarantine for another week and instructions on how to do so are included below. An excuse for 7 days will be provided for your employer. Should you develop new or worsening symptoms, you should seek evaluation at the nearest Urgent Care or ER.     How to Quarantine at Home  Information for Patients and Families    These instructions are for people with confirmed or suspected COVID-19 who do not need to be hospitalized and those with confirmed COVID-19 who were hospitalized and discharged to care for themselves at home.    If you were tested through the Health Department  The Health Department will monitor your wellbeing.  If it is determined that you do not need to be hospitalized and can be isolated at home, you will be monitored by staff from your local or state health department.     If you were tested through a Commercial Lab  You will need to monitor yourself and report changes in your symptoms to your doctor.  See the section below called Monitor Your Symptoms.    Follow these steps until a healthcare provider or local or state health department says you can return to your normal activities.    Stay home except to get medical care  • Restrict activities outside your home, except for getting medical care.   • Do not go to work, school, or public areas.   • Avoid using public transportation, ride-sharing, or taxis.    Separate yourself from other people and animals in your home  People  As much as possible, you should stay in a specific room and away from other people in your home. Also, you should use a separate bathroom, if available.    Animals  You should restrict contact with pets and other animals while you are sick with COVID-19, just like you would around other people. When possible, have another member of your household care for your animals while you  are sick. If you are sick with COVID-19, avoid contact with your pet, including petting, snuggling, being kissed or licked, and sharing food. If you must care for your pet or be around animals while you are sick, wash your hands before and after you interact with pets and wear a facemask. See COVID-19 and Animals for more information.    Call ahead before visiting your doctor  If you have a medical appointment, call the healthcare provider and tell them that you have or may have COVID-19. This information will help the healthcare provider’s office take steps to keep other people from getting infected or exposed.    Wear a facemask  You should wear a facemask when you are around other people (e.g., sharing a room or vehicle) or pets and before you enter a healthcare provider’s office.     If you are not able to wear a facemask (for example, because it causes trouble breathing), then people who live with you should not stay in the same room with you, or they should wear a facemask if they enter your room.    Cover your coughs and sneezes  • Cover your mouth and nose with a tissue when you cough or sneeze.   • Throw used tissues in a lined trash can.   • Immediately wash your hands with soap and water for at least 20 seconds or, if soap and water are not available, clean your hands with an alcohol-based hand  that contains at least 60% alcohol.    Clean your hands often  • Wash your hands often with soap and water for at least 20 seconds, especially after blowing your nose, coughing, or sneezing; going to the bathroom; and before eating or preparing food.     • If soap and water are not readily available, use an alcohol-based hand  with at least 60% alcohol, covering all surfaces of your hands and rubbing them together until they feel dry.    • Soap and water are the best option if hands are visibly dirty. Avoid touching your eyes, nose, and mouth with unwashed hands.    Avoid sharing personal  household items  • You should not share dishes, drinking glasses, cups, eating utensils, towels, or bedding with other people or pets in your home.   • After using these items, they should be washed thoroughly with soap and water.    Clean all “high-touch” surfaces everyday  • High touch surfaces include counters, tabletops, doorknobs, bathroom fixtures, toilets, phones, keyboards, tablets, and bedside tables.   • Also, clean any surfaces that may have blood, stool, or body fluids on them.   • Use a household cleaning spray or wipe, according to the label instructions. Labels contain instructions for safe and effective use of the cleaning product, including precautions you should take when applying the product, such as wearing gloves and making sure you have good ventilation during use of the product.    Monitor your symptoms  • Seek prompt medical attention if your illness is worsening (e.g., difficulty breathing).   • Before seeking care, call your healthcare provider and tell them that you have, or are being evaluated for, COVID-19.   • Put on a facemask before you enter the facility.     • These steps will help the healthcare provider’s office to keep other people in the office or waiting room from getting infected or exposed.   • Persons who are placed under active monitoring or facilitated self-monitoring should follow instructions provided by their local health department or occupational health professionals, as appropriate.  • If you have a medical emergency and need to call 911, notify the dispatch personnel that you have, or are being evaluated for COVID-19. If possible, put on a facemask before emergency medical services arrive.    Discontinuing home isolation  Patients with confirmed COVID-19 should remain under home isolation precautions until the risk of secondary transmission to others is thought to be low. The decision to discontinue home isolation precautions should be made on a case-by-case basis,  in consultation with healthcare providers and [facility] health departments.    The below content are for household members, intimate partners, and caregivers of a patient with symptomatic laboratory-confirmed COVID-19 or a patient under investigation:    Household members, intimate partners, and caregivers may have close contact with a person with symptomatic, laboratory-confirmed COVID-19 or a person under investigation.     Close contacts should monitor their health; they should call their healthcare provider right away if they develop symptoms suggestive of COVID-19 (e.g., fever, cough, shortness of breath)     Close contacts should also follow these recommendations:  • Make sure that you understand and can help the patient follow their healthcare provider’s instructions for medication(s) and care. You should help the patient with basic needs in the home and provide support for getting groceries, prescriptions, and other personal needs.  • Monitor the patient’s symptoms. If the patient is getting sicker, call his or her healthcare provider and tell them that the patient has laboratory-confirmed COVID-19. This will help the healthcare provider’s office take steps to keep other people in the office or waiting room from getting infected. Ask the healthcare provider to call the local or [facility] health department for additional guidance. If the patient has a medical emergency and you need to call 911, notify the dispatch personnel that the patient has, or is being evaluated for COVID-19.  • Household members should stay in another room or be  from the patient as much as possible. Household members should use a separate bedroom and bathroom, if available.  • Prohibit visitors who do not have an essential need to be in the home.  • Household members should care for any pets in the home. Do not handle pets or other animals while sick.  For more information, see COVID-19 and Animals.  • Make sure that shared  spaces in the home have good air flow, such as by an air conditioner or an opened window, weather permitting.  • Perform hand hygiene frequently. Wash your hands often with soap and water for at least 20 seconds or use an alcohol-based hand  that contains 60 to 95% alcohol, covering all surfaces of your hands and rubbing them together until they feel dry. Soap and water should be used preferentially if hands are visibly dirty.  • Avoid touching your eyes, nose, and mouth with unwashed hands.  • The patient should wear a facemask when you are around other people. If the patient is not able to wear a facemask (for example, because it causes trouble breathing), you, as the caregiver, should wear a mask when you are in the same room as the patient.  • Wear a disposable facemask and gloves when you touch or have contact with the patient’s blood, stool, or body fluids, such as saliva, sputum, nasal mucus, vomit, or urine.   o Throw out disposable facemasks and gloves after using them. Do not reuse.  o When removing personal protective equipment, first remove and dispose of gloves. Then, immediately clean your hands with soap and water or alcohol-based hand . Next, remove and dispose of facemask, and immediately clean your hands again with soap and water or alcohol-based hand .  • Avoid sharing household items with the patient. You should not share dishes, drinking glasses, cups, eating utensils, towels, bedding, or other items. After the patient uses these items, you should wash them thoroughly (see below “Wash laundry thoroughly”).  • Clean all “high-touch” surfaces, such as counters, tabletops, doorknobs, bathroom fixtures, toilets, phones, keyboards, tablets, and bedside tables, every day. Also, clean any surfaces that may have blood, stool, or body fluids on them.   o Use a household cleaning spray or wipe, according to the label instructions. Labels contain instructions for safe and  effective use of the cleaning product including precautions you should take when applying the product, such as wearing gloves and making sure you have good ventilation during use of the product.  • Wash laundry thoroughly.   o Immediately remove and wash clothes or bedding that have blood, stool, or body fluids on them.  o Wear disposable gloves while handling soiled items and keep soiled items away from your body. Clean your hands (with soap and water or an alcohol-based hand ) immediately after removing your gloves.  o Read and follow directions on labels of laundry or clothing items and detergent. In general, using a normal laundry detergent according to washing machine instructions and dry thoroughly using the warmest temperatures recommended on the clothing label.  • Place all used disposable gloves, facemasks, and other contaminated items in a lined container before disposing of them with other household waste. Clean your hands (with soap and water or an alcohol-based hand ) immediately after handling these items. Soap and water should be used preferentially if hands are visibly dirty.  • Discuss any additional questions with your state or local health department or healthcare provider.    Adapted from information provided by the Centers for Disease Control and Prevention.  For more information, visit https://www.cdc.gov/coronavirus/2019-ncov/hcp/guidance-prevent-spread.htmlFever, Adult         A fever is an increase in your body's temperature. It often means a temperature of 100.4°F (38°C) or higher. Brief mild or moderate fevers often have no long-term effects. They often do not need treatment. Moderate or high fevers may make you feel uncomfortable. Sometimes, they can be a sign of a serious illness or disease. A fever that keeps coming back or that lasts a long time may cause you to lose water in your body (get dehydrated).  You can take your temperature with a thermometer to see if you  have a fever. Temperature can change with:  · Age.  · Time of day.  · Where the thermometer is put in the body. Readings may vary when the thermometer is put:  ? In the mouth (oral).  ? In the butt (rectal).  ? In the ear (tympanic).  ? Under the arm (axillary).  ? On the forehead (temporal).  Follow these instructions at home:  Medicines  · Take over-the-counter and prescription medicines only as told by your doctor. Follow the dosing instructions carefully.  · If you were prescribed an antibiotic medicine, take it as told by your doctor. Do not stop taking it even if you start to feel better.  General instructions  · Watch for any changes in your symptoms. Tell your doctor about them.  · Rest as needed.  · Drink enough fluid to keep your pee (urine) pale yellow.  · Sponge yourself or bathe with room-temperature water as needed. This helps to lower your body temperature. Do not use ice water.  · Do not use too many blankets or wear clothes that are too heavy.  · If your fever was caused by an infection that spreads from person to person (is contagious), such as a cold or the flu:  ? You should stay home from work and public places for at least 24 hours after your fever is gone.  ? Your fever should be gone for at least 24 hours without the need to use medicines.  Contact a doctor if:  · You throw up (vomit).  · You cannot eat or drink without throwing up.  · You have watery poop (diarrhea).  · It hurts when you pee.  · Your symptoms do not get better with treatment.  · You have new symptoms.  · You feel very weak.  Get help right away if:  · You are short of breath or have trouble breathing.  · You are dizzy or you pass out (faint).  · You feel mixed up (confused).  · You have signs of not having enough water in your body, such as:  ? Dark pee, very little pee, or no pee.  ? Cracked lips.  ? Dry mouth.  ? Sunken eyes.  ? Sleepiness.  ? Weakness.  · You have very bad pain in your belly (abdomen).  · You keep  throwing up or having watery poop.  · You have a rash on your skin.  · Your symptoms get worse all of a sudden.  Summary  · A fever is an increase in your body's temperature. It often means a temperature of 100.4°F (38°C) or higher.  · Watch for any changes in your symptoms. Tell your doctor about them.  · Take all medicines only as told by your doctor.  · Do not go to work or other public places if your fever was caused by an illness that can spread to other people.  · Get help right away if you have signs that you do not have enough water in your body.  This information is not intended to replace advice given to you by your health care provider. Make sure you discuss any questions you have with your health care provider.  Document Released: 09/26/2009 Document Revised: 06/03/2019 Document Reviewed: 06/03/2019  Elsevier Interactive Patient Education © 2020 [employer]

## 2020-07-17 ENCOUNTER — TELEMEDICINE (OUTPATIENT)
Dept: FAMILY MEDICINE CLINIC | Facility: CLINIC | Age: 35
End: 2020-07-17

## 2020-07-17 VITALS — HEIGHT: 64 IN | WEIGHT: 113 LBS | BODY MASS INDEX: 19.29 KG/M2

## 2020-07-17 DIAGNOSIS — R63.4 WEIGHT LOSS: ICD-10-CM

## 2020-07-17 DIAGNOSIS — Z13.0 SCREENING FOR IRON DEFICIENCY ANEMIA: ICD-10-CM

## 2020-07-17 DIAGNOSIS — Z13.1 SCREENING FOR DIABETES MELLITUS: ICD-10-CM

## 2020-07-17 DIAGNOSIS — R53.83 FATIGUE, UNSPECIFIED TYPE: Primary | ICD-10-CM

## 2020-07-17 DIAGNOSIS — L65.9 HAIR LOSS: ICD-10-CM

## 2020-07-17 DIAGNOSIS — Z13.6 SCREENING FOR ISCHEMIC HEART DISEASE: ICD-10-CM

## 2020-07-17 PROCEDURE — 99213 OFFICE O/P EST LOW 20 MIN: CPT | Performed by: NURSE PRACTITIONER

## 2020-07-17 NOTE — PROGRESS NOTES
"Subjective   Peggy Suero is a 35 y.o. female.     This was an audio and video enabled telemedicine encounter.    History of Present Illness   Pt states that she is feeling fatigued since jan or feb.  She has lost 10 pounds and now losing a lot of hair and her hair is very thin in the past few months.  Mild fever off and on of . No abdomin issues no urinary issues   Pt doesn't think there are any thyroid issues in her family.  She is sleeping good and eating normally.    The following portions of the patient's history were reviewed and updated as appropriate: allergies, current medications, past social history and problem list.    Review of Systems   Constitutional: Positive for fatigue.   All other systems reviewed and are negative.      Objective   Ht 162.6 cm (64\")   Wt 51.3 kg (113 lb)   BMI 19.40 kg/m²   Physical Exam    Assessment/Plan      Diagnosis Plan   1. Fatigue, unspecified type  TSH    Vitamin B12 & Folate    Vitamin D 25 Hydroxy    Iron Profile   2. Weight loss     3. Hair loss     4. Screening for iron deficiency anemia  CBC & Differential   5. Screening for diabetes mellitus  Comprehensive Metabolic Panel   6. Screening for ischemic heart disease  Lipid Panel With LDL / HDL Ratio     Follow up after lab    Visit lasted 15 mins     LUCIANA Vigil  7/17/2020  "

## 2020-07-20 LAB
25(OH)D3+25(OH)D2 SERPL-MCNC: 25.1 NG/ML (ref 30–100)
ALBUMIN SERPL-MCNC: 4.4 G/DL (ref 3.5–5.2)
ALBUMIN/GLOB SERPL: 2 G/DL
ALP SERPL-CCNC: 48 U/L (ref 39–117)
ALT SERPL-CCNC: 10 U/L (ref 1–33)
AST SERPL-CCNC: 14 U/L (ref 1–32)
BASOPHILS # BLD AUTO: 0.08 10*3/MM3 (ref 0–0.2)
BASOPHILS NFR BLD AUTO: 1.3 % (ref 0–1.5)
BILIRUB SERPL-MCNC: 0.7 MG/DL (ref 0–1.2)
BUN SERPL-MCNC: 9 MG/DL (ref 6–20)
BUN/CREAT SERPL: 9.8 (ref 7–25)
CALCIUM SERPL-MCNC: 9.1 MG/DL (ref 8.6–10.5)
CHLORIDE SERPL-SCNC: 102 MMOL/L (ref 98–107)
CHOLEST SERPL-MCNC: 183 MG/DL (ref 0–200)
CO2 SERPL-SCNC: 27.7 MMOL/L (ref 22–29)
CREAT SERPL-MCNC: 0.92 MG/DL (ref 0.57–1)
EOSINOPHIL # BLD AUTO: 0.2 10*3/MM3 (ref 0–0.4)
EOSINOPHIL NFR BLD AUTO: 3.4 % (ref 0.3–6.2)
ERYTHROCYTE [DISTWIDTH] IN BLOOD BY AUTOMATED COUNT: 13.5 % (ref 12.3–15.4)
FOLATE SERPL-MCNC: 8.86 NG/ML (ref 4.78–24.2)
GLOBULIN SER CALC-MCNC: 2.2 GM/DL
GLUCOSE SERPL-MCNC: 79 MG/DL (ref 65–99)
HCT VFR BLD AUTO: 37.4 % (ref 34–46.6)
HDLC SERPL-MCNC: 82 MG/DL (ref 40–60)
HGB BLD-MCNC: 11.9 G/DL (ref 12–15.9)
IMM GRANULOCYTES # BLD AUTO: 0.01 10*3/MM3 (ref 0–0.05)
IMM GRANULOCYTES NFR BLD AUTO: 0.2 % (ref 0–0.5)
IRON SATN MFR SERPL: 24 % (ref 20–50)
IRON SERPL-MCNC: 116 MCG/DL (ref 37–145)
LDLC SERPL CALC-MCNC: 88 MG/DL (ref 0–100)
LDLC/HDLC SERPL: 1.07 {RATIO}
LYMPHOCYTES # BLD AUTO: 2.37 10*3/MM3 (ref 0.7–3.1)
LYMPHOCYTES NFR BLD AUTO: 39.8 % (ref 19.6–45.3)
MCH RBC QN AUTO: 27.9 PG (ref 26.6–33)
MCHC RBC AUTO-ENTMCNC: 31.8 G/DL (ref 31.5–35.7)
MCV RBC AUTO: 87.6 FL (ref 79–97)
MONOCYTES # BLD AUTO: 0.51 10*3/MM3 (ref 0.1–0.9)
MONOCYTES NFR BLD AUTO: 8.6 % (ref 5–12)
NEUTROPHILS # BLD AUTO: 2.78 10*3/MM3 (ref 1.7–7)
NEUTROPHILS NFR BLD AUTO: 46.7 % (ref 42.7–76)
NRBC BLD AUTO-RTO: 0 /100 WBC (ref 0–0.2)
PLATELET # BLD AUTO: 266 10*3/MM3 (ref 140–450)
POTASSIUM SERPL-SCNC: 3.9 MMOL/L (ref 3.5–5.2)
PROT SERPL-MCNC: 6.6 G/DL (ref 6–8.5)
RBC # BLD AUTO: 4.27 10*6/MM3 (ref 3.77–5.28)
SODIUM SERPL-SCNC: 140 MMOL/L (ref 136–145)
TIBC SERPL-MCNC: 478 MCG/DL
TRIGL SERPL-MCNC: 67 MG/DL (ref 0–150)
TSH SERPL DL<=0.005 MIU/L-ACNC: 2.87 UIU/ML (ref 0.27–4.2)
UIBC SERPL-MCNC: 362 MCG/DL (ref 112–346)
VIT B12 SERPL-MCNC: 479 PG/ML (ref 211–946)
VLDLC SERPL CALC-MCNC: 13.4 MG/DL
WBC # BLD AUTO: 5.95 10*3/MM3 (ref 3.4–10.8)

## 2020-07-24 DIAGNOSIS — L65.9 HAIR LOSS: ICD-10-CM

## 2020-07-24 DIAGNOSIS — R42 DIZZINESS: ICD-10-CM

## 2020-07-24 DIAGNOSIS — R53.83 FATIGUE, UNSPECIFIED TYPE: Primary | ICD-10-CM

## 2020-07-24 DIAGNOSIS — R63.4 WEIGHT LOSS: ICD-10-CM

## 2020-07-30 ENCOUNTER — HOSPITAL ENCOUNTER (OUTPATIENT)
Dept: CARDIOLOGY | Facility: HOSPITAL | Age: 35
Discharge: HOME | End: 2020-07-30
Admitting: NURSE PRACTITIONER

## 2020-07-30 VITALS
WEIGHT: 113 LBS | BODY MASS INDEX: 19.29 KG/M2 | OXYGEN SATURATION: 100 % | HEIGHT: 64 IN | DIASTOLIC BLOOD PRESSURE: 74 MMHG | HEART RATE: 55 BPM | SYSTOLIC BLOOD PRESSURE: 113 MMHG

## 2020-07-30 DIAGNOSIS — R42 DIZZINESS: ICD-10-CM

## 2020-07-30 DIAGNOSIS — R53.83 FATIGUE, UNSPECIFIED TYPE: ICD-10-CM

## 2020-07-30 LAB
AORTIC DIMENSIONLESS INDEX: 0.8 (DI)
BH CV ECHO MEAS - ACS: 1.5 CM
BH CV ECHO MEAS - AO ARCH DIAM (PROXIMAL TRANS.): 2.3 CM
BH CV ECHO MEAS - AO MAX PG (FULL): 2.6 MMHG
BH CV ECHO MEAS - AO MAX PG: 6.1 MMHG
BH CV ECHO MEAS - AO MEAN PG (FULL): 1 MMHG
BH CV ECHO MEAS - AO MEAN PG: 3 MMHG
BH CV ECHO MEAS - AO ROOT DIAM: 2.6 CM
BH CV ECHO MEAS - AO V2 MAX: 123 CM/SEC
BH CV ECHO MEAS - AO V2 MEAN: 78.1 CM/SEC
BH CV ECHO MEAS - AO V2 VTI: 27.4 CM
BH CV ECHO MEAS - AVA(I,A): 2.2 CM^2
BH CV ECHO MEAS - AVA(I,D): 2.2 CM^2
BH CV ECHO MEAS - AVA(V,A): 2.2 CM^2
BH CV ECHO MEAS - AVA(V,D): 2.2 CM^2
BH CV ECHO MEAS - BSA(HAYCOCK): 1.5 M^2
BH CV ECHO MEAS - BSA: 1.5 M^2
BH CV ECHO MEAS - BZI_BMI: 19.4 KILOGRAMS/M^2
BH CV ECHO MEAS - BZI_METRIC_HEIGHT: 162.6 CM
BH CV ECHO MEAS - BZI_METRIC_WEIGHT: 51.3 KG
BH CV ECHO MEAS - DESC AORTA: 1.4 CM
BH CV ECHO MEAS - EDV(CUBED): 91.1 ML
BH CV ECHO MEAS - EDV(MOD-SP2): 104 ML
BH CV ECHO MEAS - EDV(MOD-SP4): 91 ML
BH CV ECHO MEAS - EDV(TEICH): 92.4 ML
BH CV ECHO MEAS - EF(CUBED): 52.9 %
BH CV ECHO MEAS - EF(MOD-BP): 60.3 %
BH CV ECHO MEAS - EF(MOD-SP2): 60.6 %
BH CV ECHO MEAS - EF(MOD-SP4): 60.4 %
BH CV ECHO MEAS - EF(TEICH): 45 %
BH CV ECHO MEAS - ESV(CUBED): 42.9 ML
BH CV ECHO MEAS - ESV(MOD-SP2): 41 ML
BH CV ECHO MEAS - ESV(MOD-SP4): 36 ML
BH CV ECHO MEAS - ESV(TEICH): 50.9 ML
BH CV ECHO MEAS - FS: 22.2 %
BH CV ECHO MEAS - IVS/LVPW: 1
BH CV ECHO MEAS - IVSD: 0.8 CM
BH CV ECHO MEAS - LAT PEAK E' VEL: 15.9 CM/SEC
BH CV ECHO MEAS - LV DIASTOLIC VOL/BSA (35-75): 59.3 ML/M^2
BH CV ECHO MEAS - LV MASS(C)D: 113.6 GRAMS
BH CV ECHO MEAS - LV MASS(C)DI: 74 GRAMS/M^2
BH CV ECHO MEAS - LV MAX PG: 3.5 MMHG
BH CV ECHO MEAS - LV MEAN PG: 2 MMHG
BH CV ECHO MEAS - LV SYSTOLIC VOL/BSA (12-30): 23.5 ML/M^2
BH CV ECHO MEAS - LV V1 MAX: 93.3 CM/SEC
BH CV ECHO MEAS - LV V1 MEAN: 59.2 CM/SEC
BH CV ECHO MEAS - LV V1 VTI: 21.6 CM
BH CV ECHO MEAS - LVIDD: 4.5 CM
BH CV ECHO MEAS - LVIDS: 3.5 CM
BH CV ECHO MEAS - LVLD AP2: 7.5 CM
BH CV ECHO MEAS - LVLD AP4: 7.2 CM
BH CV ECHO MEAS - LVLS AP2: 6.2 CM
BH CV ECHO MEAS - LVLS AP4: 5.9 CM
BH CV ECHO MEAS - LVOT AREA (M): 2.8 CM^2
BH CV ECHO MEAS - LVOT AREA: 2.8 CM^2
BH CV ECHO MEAS - LVOT DIAM: 1.9 CM
BH CV ECHO MEAS - LVPWD: 0.8 CM
BH CV ECHO MEAS - MED PEAK E' VEL: 12.8 CM/SEC
BH CV ECHO MEAS - MV A DUR: 0.18 SEC
BH CV ECHO MEAS - MV A MAX VEL: 59.6 CM/SEC
BH CV ECHO MEAS - MV DEC SLOPE: 504 CM/SEC^2
BH CV ECHO MEAS - MV DEC TIME: 230 SEC
BH CV ECHO MEAS - MV E MAX VEL: 86.5 CM/SEC
BH CV ECHO MEAS - MV E/A: 1.5
BH CV ECHO MEAS - MV MAX PG: 4.6 MMHG
BH CV ECHO MEAS - MV MEAN PG: 1 MMHG
BH CV ECHO MEAS - MV P1/2T MAX VEL: 108 CM/SEC
BH CV ECHO MEAS - MV P1/2T: 62.8 MSEC
BH CV ECHO MEAS - MV V2 MAX: 107 CM/SEC
BH CV ECHO MEAS - MV V2 MEAN: 53.1 CM/SEC
BH CV ECHO MEAS - MV V2 VTI: 30.8 CM
BH CV ECHO MEAS - MVA P1/2T LCG: 2 CM^2
BH CV ECHO MEAS - MVA(P1/2T): 3.5 CM^2
BH CV ECHO MEAS - MVA(VTI): 2 CM^2
BH CV ECHO MEAS - PA ACC TIME: 0.14 SEC
BH CV ECHO MEAS - PA MAX PG (FULL): 2.2 MMHG
BH CV ECHO MEAS - PA MAX PG: 3.2 MMHG
BH CV ECHO MEAS - PA PR(ACCEL): 17.4 MMHG
BH CV ECHO MEAS - PA V2 MAX: 90 CM/SEC
BH CV ECHO MEAS - PULM A REVS DUR: 0.15 SEC
BH CV ECHO MEAS - PULM A REVS VEL: 36.4 CM/SEC
BH CV ECHO MEAS - PULM DIAS VEL: 58.3 CM/SEC
BH CV ECHO MEAS - PULM S/D: 0.77
BH CV ECHO MEAS - PULM SYS VEL: 45 CM/SEC
BH CV ECHO MEAS - PVA(V,A): 1.3 CM^2
BH CV ECHO MEAS - PVA(V,D): 1.3 CM^2
BH CV ECHO MEAS - QP/QS: 0.46
BH CV ECHO MEAS - RAP SYSTOLE: 8 MMHG
BH CV ECHO MEAS - RV MAX PG: 1 MMHG
BH CV ECHO MEAS - RV MEAN PG: 1 MMHG
BH CV ECHO MEAS - RV V1 MAX: 50.3 CM/SEC
BH CV ECHO MEAS - RV V1 MEAN: 33.9 CM/SEC
BH CV ECHO MEAS - RV V1 VTI: 12.3 CM
BH CV ECHO MEAS - RVOT AREA: 2.3 CM^2
BH CV ECHO MEAS - RVOT DIAM: 1.7 CM
BH CV ECHO MEAS - RVSP: 33.2 MMHG
BH CV ECHO MEAS - SI(CUBED): 31.4 ML/M^2
BH CV ECHO MEAS - SI(LVOT): 39.9 ML/M^2
BH CV ECHO MEAS - SI(MOD-SP2): 41.1 ML/M^2
BH CV ECHO MEAS - SI(MOD-SP4): 35.8 ML/M^2
BH CV ECHO MEAS - SI(TEICH): 27.1 ML/M^2
BH CV ECHO MEAS - SV(CUBED): 48.3 ML
BH CV ECHO MEAS - SV(LVOT): 61.2 ML
BH CV ECHO MEAS - SV(MOD-SP2): 63 ML
BH CV ECHO MEAS - SV(MOD-SP4): 55 ML
BH CV ECHO MEAS - SV(RVOT): 27.9 ML
BH CV ECHO MEAS - SV(TEICH): 41.6 ML
BH CV ECHO MEAS - TAPSE (>1.6): 1.7 CM2
BH CV ECHO MEAS - TR MAX VEL: 251 CM/SEC
BH CV ECHO MEASUREMENTS AVERAGE E/E' RATIO: 6.03
BH CV VAS BP LEFT ARM: NORMAL MMHG
BH CV XLRA - RV BASE: 3.2 CM
BH CV XLRA - RV LENGTH: 6.1 CM
BH CV XLRA - RV MID: 2.4 CM
BH CV XLRA - TDI S': 12 CM/SEC
LEFT ATRIUM VOLUME INDEX: 20 ML/M2
LEFT ATRIUM VOLUME: 27 CM3
LV EF 2D ECHO EST: 60 %
MAXIMAL PREDICTED HEART RATE: 184 BPM
STRESS TARGET HR: 156 BPM

## 2020-07-30 PROCEDURE — 25010000002 PERFLUTREN (DEFINITY) 8.476 MG IN SODIUM CHLORIDE 0.9 % 10 ML INJECTION: Performed by: NURSE PRACTITIONER

## 2020-07-30 PROCEDURE — 93306 TTE W/DOPPLER COMPLETE: CPT

## 2020-07-30 PROCEDURE — 93306 TTE W/DOPPLER COMPLETE: CPT | Performed by: INTERNAL MEDICINE

## 2020-07-30 RX ADMIN — PERFLUTREN 2 ML: 6.52 INJECTION, SUSPENSION INTRAVENOUS at 08:19

## 2020-08-04 ENCOUNTER — TELEPHONE (OUTPATIENT)
Dept: FAMILY MEDICINE CLINIC | Facility: CLINIC | Age: 35
End: 2020-08-04

## 2020-08-06 ENCOUNTER — TELEPHONE (OUTPATIENT)
Dept: FAMILY MEDICINE CLINIC | Facility: CLINIC | Age: 35
End: 2020-08-06

## 2020-08-06 NOTE — TELEPHONE ENCOUNTER
Patient left message because you put her off for 4 weeks from the time she was off work which would put her off until next week but she is concerned because she still has not seen the endocrinologist yet and she has concerns about her echo and wanting other test she is wanting to know if she should do. She said next week will be 4 weeks and she still does not have a resolution to go back to work

## 2020-08-10 ENCOUNTER — OFFICE VISIT (OUTPATIENT)
Dept: FAMILY MEDICINE CLINIC | Facility: CLINIC | Age: 35
End: 2020-08-10

## 2020-08-10 VITALS
HEART RATE: 71 BPM | BODY MASS INDEX: 19.36 KG/M2 | SYSTOLIC BLOOD PRESSURE: 92 MMHG | WEIGHT: 113.4 LBS | TEMPERATURE: 98 F | DIASTOLIC BLOOD PRESSURE: 60 MMHG | HEIGHT: 64 IN | OXYGEN SATURATION: 98 %

## 2020-08-10 DIAGNOSIS — R53.83 FATIGUE, UNSPECIFIED TYPE: ICD-10-CM

## 2020-08-10 DIAGNOSIS — R06.02 SHORTNESS OF BREATH: Primary | ICD-10-CM

## 2020-08-10 DIAGNOSIS — R06.02 SOB (SHORTNESS OF BREATH): ICD-10-CM

## 2020-08-10 DIAGNOSIS — L65.9 HAIR LOSS: ICD-10-CM

## 2020-08-10 PROCEDURE — 99214 OFFICE O/P EST MOD 30 MIN: CPT | Performed by: NURSE PRACTITIONER

## 2020-08-10 PROCEDURE — 71046 X-RAY EXAM CHEST 2 VIEWS: CPT | Performed by: NURSE PRACTITIONER

## 2020-08-10 RX ORDER — ALBUTEROL SULFATE 90 UG/1
2 AEROSOL, METERED RESPIRATORY (INHALATION) EVERY 4 HOURS PRN
Qty: 1 INHALER | Refills: 0 | Status: SHIPPED | OUTPATIENT
Start: 2020-08-10 | End: 2020-12-16

## 2020-08-10 NOTE — PROGRESS NOTES
"Subjective   Peggy Suero is a 35 y.o. female.     History of Present Illness   Pt presenting with continued concerns regarding fatigue, SOA and dizziness.  Pt states her temperature is normally around 97.5 and in the afternoons she is taking her temp and it is 99.0.  She states that she doesn't feel right and hasn't in a while.  States that she will get dizzy when she stands from a sitting position. Happens most every time if she gets up quickly.  She has also lost a lot of hair. Has been referred out to endocrinology Sept 4th.   She says that she feels SOA and only feels like she gets good air when standing up straight or laying down  IF sitting down she has to lean back in order to get good air.    The following portions of the patient's history were reviewed and updated as appropriate: allergies, current medications, past social history and problem list.    Review of Systems   Constitutional: Positive for fatigue.   Respiratory: Positive for shortness of breath.    Neurological: Positive for dizziness.       Objective   BP 92/60   Pulse 71   Temp 98 °F (36.7 °C)   Ht 162.6 cm (64\")   Wt 51.4 kg (113 lb 6.4 oz)   SpO2 98%   BMI 19.47 kg/m²   Physical Exam   Constitutional: She is oriented to person, place, and time. Vital signs are normal. She appears well-developed and well-nourished. No distress.   HENT:   Head: Normocephalic.   Cardiovascular: Normal rate, regular rhythm and normal heart sounds.   Pulmonary/Chest: Effort normal and breath sounds normal.   Neurological: She is alert and oriented to person, place, and time. Gait normal.   Psychiatric: She has a normal mood and affect. Her behavior is normal. Judgment and thought content normal.   Vitals reviewed.    Xray- chest     Findings- normal   No comparison     Assessment/Plan      Diagnosis Plan   1. Shortness of breath  XR Chest PA & Lateral (In Office)   2. Fatigue, unspecified type     3. SOB (shortness of breath)  Ambulatory Referral to " Pulmonology    albuterol sulfate  (90 Base) MCG/ACT inhaler   4. Hair loss       Continue with referral to endocrinology new referral to pulmonology for pulmonary testing.  Gave her an inhaler to see if that does help her with shortness of breath.  I do not feel there is any reason to repeat labs at this point considering all of them were normal.  Chest x-ray was normal today.  [facility] paperwork is already been filled out for her.  Sohail Mckinney, APRN  8/10/2020

## 2020-08-14 ENCOUNTER — DOCUMENTATION (OUTPATIENT)
Dept: FAMILY MEDICINE CLINIC | Facility: CLINIC | Age: 35
End: 2020-08-14

## 2020-08-14 NOTE — PROGRESS NOTES
Received more [place] paperwork on Aug 12th in nikole's note she stated she already filled out [place] paperwork for her and did not state whether she is extending it any further

## 2020-09-01 ENCOUNTER — OFFICE VISIT (OUTPATIENT)
Dept: ENDOCRINOLOGY | Age: 35
End: 2020-09-01

## 2020-09-01 VITALS
WEIGHT: 111.4 LBS | BODY MASS INDEX: 18.56 KG/M2 | HEART RATE: 66 BPM | HEIGHT: 65 IN | OXYGEN SATURATION: 96 % | DIASTOLIC BLOOD PRESSURE: 68 MMHG | SYSTOLIC BLOOD PRESSURE: 106 MMHG

## 2020-09-01 DIAGNOSIS — R94.6 ABNORMAL THYROID FUNCTION TEST: ICD-10-CM

## 2020-09-01 DIAGNOSIS — L65.9 HAIR LOSS: ICD-10-CM

## 2020-09-01 DIAGNOSIS — R53.82 CHRONIC FATIGUE: Primary | ICD-10-CM

## 2020-09-01 DIAGNOSIS — R63.4 WEIGHT LOSS: ICD-10-CM

## 2020-09-01 PROCEDURE — 99204 OFFICE O/P NEW MOD 45 MIN: CPT | Performed by: INTERNAL MEDICINE

## 2020-09-01 NOTE — PROGRESS NOTES
Chief Complaint   Patient presents with   • Alopecia   • Weight Loss   • Pain   • Fatigue     NEW PATIENT / CHRONIC FATIGUE HAIR LOSS   HPI  Peggy Suero,35 y.o. WF is here as a new pt for the evaluation of concern of thyroid abnormality. Consulted by Miss. Mckinney.    Pt reports having symptoms for about 2-3 year(s).    She complains of feeling tired for about 3 years, losing weight of about 15 pounds in the last 1 year, c/o diarrhea and constipation alternating.   Significant c/o hair loss for about 6 months, increased sweating, c/o dry skin , sleep is relatively ok. No c/o heat intolerance and c/o cold intolerance. ? c/o tremors, does have racing of heart along with c/o anxiety and c/o eye symptoms with blurry vision when she has dizziness.  Never had low blood pressure officially diagnosed.  Denied c/o difficulty breathing, swallowing and change in voice.   ? family hx of thyroid disease.   Does c/o afternoon subjective fevers ! At temp of 99 F.    Menarche at age 12, menstrual cycles regular, 2 kids of her own.     Reviewed primary care physician's/consulting physician documentation and lab results :     I have reviewed the patient's allergies, medicines, past medical hx, family hx and social hx in detail.    Past Medical History:   Diagnosis Date   • Depression    • GERD (gastroesophageal reflux disease)    • Hernia, hiatal    • Migraine    • Murmur, cardiac    • Ulcer of abdomen wall (CMS/HCC) 2007       Family History   Problem Relation Age of Onset   • Diabetes Mother    • Migraines Mother    • Dementia Mother    • Hypertension Mother    • Thyroid disease Mother    • Asthma Father    • Hyperlipidemia Father    • Hypertension Father    • Cervical cancer Maternal Grandmother    • Dementia Maternal Grandmother    • Uterine cancer Paternal Grandmother    • Dementia Paternal Grandmother    • Stroke Paternal Grandmother    • Hypertension Paternal Grandmother    • Heart disease Paternal Grandmother    •  "Diabetes Paternal Grandmother    • Thyroid disease Paternal Grandmother    • Kidney disease Paternal Grandmother    • Heart disease Maternal Grandfather    • Heart disease Paternal Grandfather        Social History     Socioeconomic History   • Marital status:      Spouse name: Not on file   • Number of children: Not on file   • Years of education: Not on file   • Highest education level: Not on file   Tobacco Use   • Smoking status: Never Smoker   • Smokeless tobacco: Never Used   Substance and Sexual Activity   • Alcohol use: Yes     Comment: occ   • Drug use: No   • Sexual activity: Defer       No Known Allergies      Current Outpatient Medications:   •  albuterol sulfate  (90 Base) MCG/ACT inhaler, Inhale 2 puffs Every 4 (Four) Hours As Needed for Wheezing., Disp: 1 inhaler, Rfl: 0     Review of Systems   Constitutional: Positive for appetite change and fatigue. Negative for fever.   HENT: Positive for trouble swallowing.    Eyes: Negative for visual disturbance.   Respiratory: Negative for shortness of breath.    Cardiovascular: Positive for palpitations. Negative for leg swelling.   Gastrointestinal: Negative for abdominal pain and vomiting.   Endocrine: Negative for polydipsia and polyuria.   Musculoskeletal: Positive for arthralgias. Negative for joint swelling and neck pain.   Skin: Negative for rash.   Neurological: Positive for dizziness and weakness. Negative for numbness.   Psychiatric/Behavioral: Negative for behavioral problems.     I have reviewed the ROS as documented by the MA Charlie Garrison MD.      Objective:    /68   Pulse 66   Ht 165.1 cm (65\")   Wt 50.5 kg (111 lb 6.4 oz)   SpO2 96%   BMI 18.54 kg/m²     Physical Exam   Constitutional: She is oriented to person, place, and time. She appears well-nourished.   HENT:   Head: Normocephalic and atraumatic.   Eyes: Conjunctivae and EOM are normal. No scleral icterus.   Neck: Normal range of motion. Neck supple. No " thyromegaly present.   Cardiovascular: Normal rate and normal heart sounds. Exam reveals no friction rub.   No murmur heard.  Pulmonary/Chest: Effort normal and breath sounds normal. No stridor. She has no wheezes. She has no rales.   Abdominal: Soft. Bowel sounds are normal. She exhibits no distension. There is no tenderness.   Musculoskeletal: She exhibits no edema or tenderness.   Lymphadenopathy:     She has no cervical adenopathy.   Neurological: She is alert and oriented to person, place, and time.   Skin: Skin is warm and dry. She is not diaphoretic.   Psychiatric: She has a normal mood and affect.   Vitals reviewed.      Results Review:    I reviewed the patient's new clinical results.    Hospital Outpatient Visit on 07/30/2020   Component Date Value Ref Range Status   • BSA 07/30/2020 1.5  m^2 Final   • IVSd 07/30/2020 0.8  cm Final   • LVIDd 07/30/2020 4.5  cm Final   • LVIDs 07/30/2020 3.5  cm Final   • LVPWd 07/30/2020 0.8  cm Final   • IVS/LVPW 07/30/2020 1.0   Final   • FS 07/30/2020 22.2  % Final   • EDV(Teich) 07/30/2020 92.4  ml Final   • ESV(Teich) 07/30/2020 50.9  ml Final   • EF(Teich) 07/30/2020 45.0  % Final   • EDV(cubed) 07/30/2020 91.1  ml Final   • ESV(cubed) 07/30/2020 42.9  ml Final   • EF(cubed) 07/30/2020 52.9  % Final   • LV mass(C)d 07/30/2020 113.6  grams Final   • LV mass(C)dI 07/30/2020 74.0  grams/m^2 Final   • SV(Teich) 07/30/2020 41.6  ml Final   • SI(Teich) 07/30/2020 27.1  ml/m^2 Final   • SV(cubed) 07/30/2020 48.3  ml Final   • SI(cubed) 07/30/2020 31.4  ml/m^2 Final   • ACS 07/30/2020 1.5  cm Final   • Ao Arch Diam (Proximal trans.) 07/30/2020 2.3  cm Final   • desc Ao Diam 07/30/2020 1.4  cm Final   • LVOT diam 07/30/2020 1.9  cm Final   • LVOT area 07/30/2020 2.8  cm^2 Final   • LVOT area(traced) 07/30/2020 2.8  cm^2 Final   • RVOT diam 07/30/2020 1.7  cm Final   • RVOT area 07/30/2020 2.3  cm^2 Final   • LVLd ap4 07/30/2020 7.2  cm Final   • EDV(MOD-sp4) 07/30/2020 91.0   ml Final   • LVLs ap4 07/30/2020 5.9  cm Final   • ESV(MOD-sp4) 07/30/2020 36.0  ml Final   • EF(MOD-sp4) 07/30/2020 60.4  % Final   • LVLd ap2 07/30/2020 7.5  cm Final   • EDV(MOD-sp2) 07/30/2020 104.0  ml Final   • LVLs ap2 07/30/2020 6.2  cm Final   • ESV(MOD-sp2) 07/30/2020 41.0  ml Final   • EF(MOD-sp2) 07/30/2020 60.6  % Final   • SV(MOD-sp4) 07/30/2020 55.0  ml Final   • SI(MOD-sp4) 07/30/2020 35.8  ml/m^2 Final   • SV(MOD-sp2) 07/30/2020 63.0  ml Final   • SI(MOD-sp2) 07/30/2020 41.1  ml/m^2 Final   • LV Liriano Vol (BSA corrected) 07/30/2020 59.3  ml/m^2 Final   • LV Sys Vol (BSA corrected) 07/30/2020 23.5  ml/m^2 Final   • EF(MOD-bp) 07/30/2020 60.3  % Final   • MV A dur 07/30/2020 0.18  sec Final   • MV E max aleida 07/30/2020 86.5  cm/sec Final   • MV A max aleida 07/30/2020 59.6  cm/sec Final   • MV E/A 07/30/2020 1.5   Final   • MV V2 max 07/30/2020 107.0  cm/sec Final   • MV max PG 07/30/2020 4.6  mmHg Final   • MV V2 mean 07/30/2020 53.1  cm/sec Final   • MV mean PG 07/30/2020 1.0  mmHg Final   • MV V2 VTI 07/30/2020 30.8  cm Final   • MVA(VTI) 07/30/2020 2.0  cm^2 Final   • MV P1/2t max aleida 07/30/2020 108.0  cm/sec Final   • MV P1/2t 07/30/2020 62.8  msec Final   • MVA(P1/2t) 07/30/2020 3.5  cm^2 Final   • MV dec slope 07/30/2020 504.0  cm/sec^2 Final   • MV dec time 07/30/2020 230  sec Final   • Ao pk aleida 07/30/2020 123.0  cm/sec Final   • Ao max PG 07/30/2020 6.1  mmHg Final   • Ao max PG (full) 07/30/2020 2.6  mmHg Final   • Ao V2 mean 07/30/2020 78.1  cm/sec Final   • Ao mean PG 07/30/2020 3.0  mmHg Final   • Ao mean PG (full) 07/30/2020 1.0  mmHg Final   • Ao V2 VTI 07/30/2020 27.4  cm Final   • MAXIMO(I,A) 07/30/2020 2.2  cm^2 Final   • MAXIMO(I,D) 07/30/2020 2.2  cm^2 Final   • MAXIMO(V,A) 07/30/2020 2.2  cm^2 Final   • MAXIMO(V,D) 07/30/2020 2.2  cm^2 Final   • LV V1 max PG 07/30/2020 3.5  mmHg Final   • LV V1 mean PG 07/30/2020 2.0  mmHg Final   • LV V1 max 07/30/2020 93.3  cm/sec Final   • LV V1 mean  07/30/2020 59.2  cm/sec Final   • LV V1 VTI 07/30/2020 21.6  cm Final   • SV(LVOT) 07/30/2020 61.2  ml Final   • SV(RVOT) 07/30/2020 27.9  ml Final   • SI(LVOT) 07/30/2020 39.9  ml/m^2 Final   • PA V2 max 07/30/2020 90.0  cm/sec Final   • PA max PG 07/30/2020 3.2  mmHg Final   • PA max PG (full) 07/30/2020 2.2  mmHg Final   • BH CV ECHO PORFIRIO - PVA(V,A) 07/30/2020 1.3  cm^2 Final   • BH CV ECHO PORFIRIO - PVA(V,D) 07/30/2020 1.3  cm^2 Final   • PA acc time 07/30/2020 0.14  sec Final   • RV V1 max PG 07/30/2020 1.0  mmHg Final   • RV V1 mean PG 07/30/2020 1.0  mmHg Final   • RV V1 max 07/30/2020 50.3  cm/sec Final   • RV V1 mean 07/30/2020 33.9  cm/sec Final   • RV V1 VTI 07/30/2020 12.3  cm Final   • TR max silver 07/30/2020 251.0  cm/sec Final   • RVSP(TR) 07/30/2020 33.2  mmHg Final   • RAP systole 07/30/2020 8.0  mmHg Final   • PA pr(Accel) 07/30/2020 17.4  mmHg Final   • Pulm Sys Silver 07/30/2020 45.0  cm/sec Final   • Pulm Liriano Silver 07/30/2020 58.3  cm/sec Final   • Pulm S/D 07/30/2020 0.77   Final   • Qp/Qs 07/30/2020 0.46   Final   • Pulm A Revs Dur 07/30/2020 0.15  sec Final   • Pulm A Revs Silver 07/30/2020 36.4  cm/sec Final   • MVA P1/2T LCG 07/30/2020 2.0  cm^2 Final   • RV Base 07/30/2020 3.2  cm Final   • RV Length 07/30/2020 6.1  cm Final   • RV Mid 07/30/2020 2.4  cm Final   • Lat Peak E' Silver 07/30/2020 15.9  cm/sec Final   • Med Peak E' Silver 07/30/2020 12.8  cm/sec Final   • BH CV ECHO PORFIRIO - BZI_BMI 07/30/2020 19.4  kilograms/m^2 Final   • BH CV ECHO PORFIRIO - BSA(H[place]COCK) 07/30/2020 1.5  m^2 Final   • BH CV ECHO PORFIRIO - BZI_METRIC_WEIGHT 07/30/2020 51.3  kg Final   • BH CV ECHO PORFIRIO - BZI_METRIC_HEIGHT 07/30/2020 162.6  cm Final   • Avg E/e' ratio 07/30/2020 6.03   Final   • Target HR (85%) 07/30/2020 156  bpm Final   • Max. Pred. HR (100%) 07/30/2020 184  bpm Final   • BH CV VAS BP LEFT ARM 07/30/2020 113/74  mmHg Final   • RV S' 07/30/2020 12.00  cm/sec Final   • LA volume 07/30/2020 27.0  cm3 Final   •  "Dimensionless Index 07/30/2020 0.8  (DI) Final   • LA Volume Index 07/30/2020 20.0  mL/m2 Final   • TAPSE (>1.6) 07/30/2020 1.70  cm2 Final   • Ao root diam 07/30/2020 2.6  cm Final   • Echo EF Estimated 07/30/2020 60  % Final       Peggy was seen today for alopecia, weight loss, pain and fatigue.    Diagnoses and all orders for this visit:    Chronic fatigue  -     TSH  -     T4, Free  -     Thyroid Peroxidase Antibody  -     T3, Free  -     ACTH  -     Cortisol, Urine, Free 24Hr - Urine, Clean Catch    Abnormal thyroid function test  -     TSH  -     T4, Free  -     Thyroid Peroxidase Antibody  -     T3, Free  -     ACTH  -     Cortisol, Urine, Free 24Hr - Urine, Clean Catch    Hair loss  -     TSH  -     T4, Free  -     Thyroid Peroxidase Antibody  -     T3, Free  -     ACTH  -     Cortisol, Urine, Free 24Hr - Urine, Clean Catch    Weight loss  -     TSH  -     T4, Free  -     Thyroid Peroxidase Antibody  -     T3, Free  -     ACTH  -     Cortisol, Urine, Free 24Hr - Urine, Clean Catch      Concerns for thyroid abnormality  Check thyroid panel  Check TPO antibody.  Discussed with the patient that she definitely has some symptoms persistent with hypothyroidism and she also has some consistent with the hypothyroidism.  However with the hormonal concerns, some of the symptoms could be subjective and we definitely need a laboratory data to support the diagnosis.    Explained to the patient that if there is an abnormality we would consider starting her on either the thyroid replacement or the antithyroid medication to help with her concerns.     Weight loss  We will also check ACTH, 24-hour urine cortisol levels to rule out Wallace's disease.      Thank you for asking me to see your patient Peggy Suero in consultation.        Charlie Garrison MD  09/01/20    EMR Dragon / transcription disclaimer:     \"Dictated utilizing Dragon dictation\".         "

## 2020-09-02 LAB
ACTH PLAS-MCNC: 18 PG/ML (ref 7.2–63.3)
T3FREE SERPL-MCNC: 3 PG/ML (ref 2–4.4)
T4 FREE SERPL-MCNC: 1.42 NG/DL (ref 0.93–1.7)
THYROPEROXIDASE AB SERPL-ACNC: <9 IU/ML (ref 0–34)
TSH SERPL DL<=0.005 MIU/L-ACNC: 1.26 UIU/ML (ref 0.27–4.2)

## 2020-09-09 LAB
CORTIS F 24H UR-MRATE: 13 UG/24 HR (ref 6–42)
CORTIS F UR-MCNC: 9 UG/L

## 2020-09-17 DIAGNOSIS — R42 DIZZINESS: ICD-10-CM

## 2020-09-17 DIAGNOSIS — R53.83 FATIGUE, UNSPECIFIED TYPE: Primary | ICD-10-CM

## 2020-09-17 DIAGNOSIS — R63.4 WEIGHT LOSS: ICD-10-CM

## 2020-09-17 DIAGNOSIS — L65.9 HAIR LOSS: ICD-10-CM

## 2020-12-16 ENCOUNTER — OFFICE VISIT (OUTPATIENT)
Dept: FAMILY MEDICINE CLINIC | Facility: CLINIC | Age: 35
End: 2020-12-16

## 2020-12-16 VITALS
OXYGEN SATURATION: 98 % | TEMPERATURE: 97.5 F | WEIGHT: 107.2 LBS | HEART RATE: 69 BPM | SYSTOLIC BLOOD PRESSURE: 124 MMHG | HEIGHT: 65 IN | BODY MASS INDEX: 17.86 KG/M2 | DIASTOLIC BLOOD PRESSURE: 70 MMHG

## 2020-12-16 DIAGNOSIS — R79.0 LOW FERRITIN: Primary | ICD-10-CM

## 2020-12-16 PROCEDURE — 99213 OFFICE O/P EST LOW 20 MIN: CPT | Performed by: NURSE PRACTITIONER

## 2020-12-16 NOTE — PROGRESS NOTES
"Subjective   Peggy Suero is a 35 y.o. female.   Chief Complaint   Patient presents with   • rheumatology f/u     Patient is here to f/u on symptoms she was having and since she saw the rheumatologist ( wore mask and goggles)        History of Present Illness   Patient presented to the office today to follow-up from the specialist that she is gone to for her chronic fatigue.  She went to her rheumatologist that showed that she had a low ferritin but all of her other labs are normal except for an KASSIE so she will be returning to the rheumatologist in 6 months for further labs.  She also went to an endocrinologist he and everything was normal there as well.  She is here to see if she can get ferritin infusions and if that will help.    The following portions of the patient's history were reviewed and updated as appropriate: allergies, current medications, past social history and problem list.    Review of Systems   Constitutional: Positive for fatigue.   All other systems reviewed and are negative.      Objective   /70   Pulse 69   Temp 97.5 °F (36.4 °C)   Ht 165.1 cm (65\")   Wt 48.6 kg (107 lb 3.2 oz)   SpO2 98%   BMI 17.84 kg/m²   Physical Exam  Vitals signs reviewed.   Constitutional:       General: She is not in acute distress.     Appearance: She is well-developed.   HENT:      Head: Normocephalic.   Cardiovascular:      Rate and Rhythm: Normal rate and regular rhythm.      Heart sounds: Normal heart sounds.   Pulmonary:      Effort: Pulmonary effort is normal.      Breath sounds: Normal breath sounds.   Neurological:      Mental Status: She is alert and oriented to person, place, and time.      Gait: Gait normal.   Psychiatric:         Behavior: Behavior normal.         Thought Content: Thought content normal.         Judgment: Judgment normal.         Assessment/Plan      Diagnosis Plan   1. Low ferritin  CBC & Differential    Iron Profile    Ferritin   I do see where lab work showed she had a " ferritin of 5 regular recheck the CBC iron panel and ferritin today and then go from there.  Patient verbalized understanding.    Mask and googles worn      Sohail Mckinney, APRN  12/16/2020

## 2020-12-17 LAB
BASOPHILS # BLD AUTO: 0.06 10*3/MM3 (ref 0–0.2)
BASOPHILS NFR BLD AUTO: 1 % (ref 0–1.5)
EOSINOPHIL # BLD AUTO: 0.17 10*3/MM3 (ref 0–0.4)
EOSINOPHIL NFR BLD AUTO: 2.7 % (ref 0.3–6.2)
ERYTHROCYTE [DISTWIDTH] IN BLOOD BY AUTOMATED COUNT: 13.3 % (ref 12.3–15.4)
FERRITIN SERPL-MCNC: 6.4 NG/ML (ref 13–150)
HCT VFR BLD AUTO: 37.1 % (ref 34–46.6)
HGB BLD-MCNC: 11.9 G/DL (ref 12–15.9)
IMM GRANULOCYTES # BLD AUTO: 0.02 10*3/MM3 (ref 0–0.05)
IMM GRANULOCYTES NFR BLD AUTO: 0.3 % (ref 0–0.5)
IRON SATN MFR SERPL: 5 % (ref 20–50)
IRON SERPL-MCNC: 30 MCG/DL (ref 37–145)
LYMPHOCYTES # BLD AUTO: 2.14 10*3/MM3 (ref 0.7–3.1)
LYMPHOCYTES NFR BLD AUTO: 34 % (ref 19.6–45.3)
MCH RBC QN AUTO: 27.4 PG (ref 26.6–33)
MCHC RBC AUTO-ENTMCNC: 32.1 G/DL (ref 31.5–35.7)
MCV RBC AUTO: 85.5 FL (ref 79–97)
MONOCYTES # BLD AUTO: 0.43 10*3/MM3 (ref 0.1–0.9)
MONOCYTES NFR BLD AUTO: 6.8 % (ref 5–12)
NEUTROPHILS # BLD AUTO: 3.47 10*3/MM3 (ref 1.7–7)
NEUTROPHILS NFR BLD AUTO: 55.2 % (ref 42.7–76)
NRBC BLD AUTO-RTO: 0 /100 WBC (ref 0–0.2)
PLATELET # BLD AUTO: 301 10*3/MM3 (ref 140–450)
RBC # BLD AUTO: 4.34 10*6/MM3 (ref 3.77–5.28)
TIBC SERPL-MCNC: 548 MCG/DL
UIBC SERPL-MCNC: 518 MCG/DL (ref 112–346)
WBC # BLD AUTO: 6.29 10*3/MM3 (ref 3.4–10.8)

## 2020-12-18 DIAGNOSIS — E61.1 LOW IRON: Primary | ICD-10-CM

## 2021-01-06 ENCOUNTER — OFFICE VISIT (OUTPATIENT)
Dept: GASTROENTEROLOGY | Facility: CLINIC | Age: 36
End: 2021-01-06

## 2021-01-06 VITALS — HEIGHT: 65 IN | BODY MASS INDEX: 18.31 KG/M2 | WEIGHT: 109.9 LBS

## 2021-01-06 DIAGNOSIS — R79.0 LOW FERRITIN: ICD-10-CM

## 2021-01-06 DIAGNOSIS — R63.4 WEIGHT LOSS: Primary | ICD-10-CM

## 2021-01-06 PROCEDURE — 99204 OFFICE O/P NEW MOD 45 MIN: CPT | Performed by: INTERNAL MEDICINE

## 2021-01-06 RX ORDER — SODIUM CHLORIDE, SODIUM LACTATE, POTASSIUM CHLORIDE, CALCIUM CHLORIDE 600; 310; 30; 20 MG/100ML; MG/100ML; MG/100ML; MG/100ML
30 INJECTION, SOLUTION INTRAVENOUS CONTINUOUS
Status: CANCELLED | OUTPATIENT
Start: 2021-01-20

## 2021-01-06 NOTE — PROGRESS NOTES
Chief Complaint   Patient presents with   • Difficulty Swallowing   • Globus   • Upper epi gastric pain     Subjective   HPI  Peggy Suero is a 35 y.o. female who presents today for new patient evaluation  Patient complains of fatigue present for the last 6 to 12 months.  She is noted to have a mild anemia with decreased iron indices on blood work.  She has been evaluated by rheumatology and endocrinology with no specific abnormalities noted.  She does complain of some vague upper abdominal pain typically worsened by prolonged periods of sitting.  She denies any nausea or vomiting.  She has chronic fluctuation of bowel habit but no rectal bleeding.  She reports regular menstrual cycles.  She endorses about 20lbs unintentional weight loss over last year.     She was evaluated in this office in 2016 for some dyspeptic symptoms and underwent EGD which was fairly unremarkable.  She previously had a colonoscopy in 2007 per her reports.      Objective   There were no vitals filed for this visit.  Physical Exam  Vitals signs reviewed.   Constitutional:       Appearance: Normal appearance. She is well-developed.      Comments: Thin     Neck:      Musculoskeletal: Normal range of motion and neck supple.   Cardiovascular:      Rate and Rhythm: Normal rate and regular rhythm.      Heart sounds: Normal heart sounds.   Pulmonary:      Effort: Pulmonary effort is normal. No respiratory distress.      Breath sounds: Normal breath sounds. No wheezing.   Abdominal:      General: Bowel sounds are normal.      Palpations: Abdomen is soft.   Skin:     General: Skin is warm and dry.   Neurological:      Mental Status: She is alert and oriented to person, place, and time.   Psychiatric:         Mood and Affect: Mood and affect normal.         Behavior: Behavior normal.         Thought Content: Thought content normal.         Cognition and Memory: Memory normal.         Judgment: Judgment normal.       The following data was reviewed  by: Tim Hunter MD on 01/06/2021:  CMP    CMP 7/20/20   Glucose 79   BUN 9   Creatinine 0.92   eGFR Non  Am 69   eGFR African Am 84   Sodium 140   Potassium 3.9   Chloride 102   Calcium 9.1   Total Protein 6.6   Albumin 4.40   Globulin 2.2   Total Bilirubin 0.7   Alkaline Phosphatase 48   AST (SGOT) 14   ALT (SGPT) 10           CBC w/diff    CBC w/Diff 7/20/20 12/16/20   WBC 5.95 6.29   RBC 4.27 4.34   Hemoglobin 11.9 (A) 11.9 (A)   Hematocrit 37.4 37.1   MCV 87.6 85.5   MCH 27.9 27.4   MCHC 31.8 32.1   RDW 13.5 13.3   Platelets 266 301   Neutrophil Rel % 46.7 55.2   Lymphocyte Rel % 39.8 34.0   Monocyte Rel % 8.6 6.8   Eosinophil Rel % 3.4 2.7   Basophil Rel % 1.3 1.0   (A) Abnormal value               Assessment/Plan   Assessment:     1. Weight loss    2. Low ferritin    3.      Fatigue    Plan:   Recommend bidirectional endoscopy for evaluation of the patient's weight loss and iron deficiency anemia  If endoscopy is unrevealing, would consider CT scan A/P          Tim Hunter M.D.  [facility]  [address] [place] [address]  Office: [phone]

## 2021-01-06 NOTE — H&P (VIEW-ONLY)
Chief Complaint   Patient presents with   • Difficulty Swallowing   • Globus   • Upper epi gastric pain     Subjective   HPI  Peggy Suero is a 35 y.o. female who presents today for new patient evaluation  Patient complains of fatigue present for the last 6 to 12 months.  She is noted to have a mild anemia with decreased iron indices on blood work.  She has been evaluated by rheumatology and endocrinology with no specific abnormalities noted.  She does complain of some vague upper abdominal pain typically worsened by prolonged periods of sitting.  She denies any nausea or vomiting.  She has chronic fluctuation of bowel habit but no rectal bleeding.  She reports regular menstrual cycles.  She endorses about 20lbs unintentional weight loss over last year.     She was evaluated in this office in 2016 for some dyspeptic symptoms and underwent EGD which was fairly unremarkable.  She previously had a colonoscopy in 2007 per her reports.      Objective   There were no vitals filed for this visit.  Physical Exam  Vitals signs reviewed.   Constitutional:       Appearance: Normal appearance. She is well-developed.      Comments: Thin     Neck:      Musculoskeletal: Normal range of motion and neck supple.   Cardiovascular:      Rate and Rhythm: Normal rate and regular rhythm.      Heart sounds: Normal heart sounds.   Pulmonary:      Effort: Pulmonary effort is normal. No respiratory distress.      Breath sounds: Normal breath sounds. No wheezing.   Abdominal:      General: Bowel sounds are normal.      Palpations: Abdomen is soft.   Skin:     General: Skin is warm and dry.   Neurological:      Mental Status: She is alert and oriented to person, place, and time.   Psychiatric:         Mood and Affect: Mood and affect normal.         Behavior: Behavior normal.         Thought Content: Thought content normal.         Cognition and Memory: Memory normal.         Judgment: Judgment normal.       The following data was reviewed  by: Tim Hunter MD on 01/06/2021:  CMP    CMP 7/20/20   Glucose 79   BUN 9   Creatinine 0.92   eGFR Non  Am 69   eGFR African Am 84   Sodium 140   Potassium 3.9   Chloride 102   Calcium 9.1   Total Protein 6.6   Albumin 4.40   Globulin 2.2   Total Bilirubin 0.7   Alkaline Phosphatase 48   AST (SGOT) 14   ALT (SGPT) 10           CBC w/diff    CBC w/Diff 7/20/20 12/16/20   WBC 5.95 6.29   RBC 4.27 4.34   Hemoglobin 11.9 (A) 11.9 (A)   Hematocrit 37.4 37.1   MCV 87.6 85.5   MCH 27.9 27.4   MCHC 31.8 32.1   RDW 13.5 13.3   Platelets 266 301   Neutrophil Rel % 46.7 55.2   Lymphocyte Rel % 39.8 34.0   Monocyte Rel % 8.6 6.8   Eosinophil Rel % 3.4 2.7   Basophil Rel % 1.3 1.0   (A) Abnormal value               Assessment/Plan   Assessment:     1. Weight loss    2. Low ferritin    3.      Fatigue    Plan:   Recommend bidirectional endoscopy for evaluation of the patient's weight loss and iron deficiency anemia  If endoscopy is unrevealing, would consider CT scan A/P          Tim Hunter M.D.  [facility]  [address] [place] [address]  Office: [phone]

## 2021-01-07 ENCOUNTER — OFFICE VISIT (OUTPATIENT)
Dept: FAMILY MEDICINE CLINIC | Facility: CLINIC | Age: 36
End: 2021-01-07

## 2021-01-07 VITALS
TEMPERATURE: 97.9 F | BODY MASS INDEX: 18.16 KG/M2 | HEIGHT: 65 IN | HEART RATE: 56 BPM | WEIGHT: 109 LBS | DIASTOLIC BLOOD PRESSURE: 72 MMHG | OXYGEN SATURATION: 98 % | SYSTOLIC BLOOD PRESSURE: 110 MMHG

## 2021-01-07 DIAGNOSIS — R34 DECREASED URINATION: ICD-10-CM

## 2021-01-07 DIAGNOSIS — Z12.4 PAP SMEAR FOR CERVICAL CANCER SCREENING: ICD-10-CM

## 2021-01-07 DIAGNOSIS — R10.9 FLANK PAIN: Primary | ICD-10-CM

## 2021-01-07 DIAGNOSIS — R82.90 ABNORMAL URINE ODOR: ICD-10-CM

## 2021-01-07 LAB
BILIRUB BLD-MCNC: NEGATIVE MG/DL
CLARITY, POC: CLEAR
COLOR UR: YELLOW
GLUCOSE UR STRIP-MCNC: NEGATIVE MG/DL
KETONES UR QL: NEGATIVE
LEUKOCYTE EST, POC: ABNORMAL
NITRITE UR-MCNC: NEGATIVE MG/ML
PH UR: 6.5 [PH] (ref 5–8)
PROT UR STRIP-MCNC: NEGATIVE MG/DL
RBC # UR STRIP: NEGATIVE /UL
SP GR UR: 1.03 (ref 1–1.03)
UROBILINOGEN UR QL: NORMAL

## 2021-01-07 PROCEDURE — 81003 URINALYSIS AUTO W/O SCOPE: CPT | Performed by: FAMILY MEDICINE

## 2021-01-07 PROCEDURE — 99214 OFFICE O/P EST MOD 30 MIN: CPT | Performed by: FAMILY MEDICINE

## 2021-01-07 RX ORDER — NITROFURANTOIN 25; 75 MG/1; MG/1
100 CAPSULE ORAL 2 TIMES DAILY
Qty: 10 CAPSULE | Refills: 0 | Status: SHIPPED | OUTPATIENT
Start: 2021-01-07 | End: 2021-01-12

## 2021-01-07 NOTE — PROGRESS NOTES
Peggy Suero is a 35 y.o. female.     Chief Complaint   Patient presents with   • Difficulty Urinating     few day with dysuria  lower back pain         HPI     Pt is a pleasant 35 y.o. YO female here for symptoms of burning with urination and lower back pain.  She is a new patient to me here today for sick visit.  Her PCP is another provider in the office.    Patient symptoms began about 2 or 3 days ago.  She started having some left-sided low back/flank pain.  She also noticed that she was urinating with less frequency and her urine was darker in color.  Patient has also noticed a stronger abnormal smell with her urine.  She denies any dysuria, hematuria, or abdominal pain.  She has been having a little bit of lower pelvic discomfort but is about to start her menstrual cycle which she thinks may be related.    The following portions of the patient's history were reviewed by me and updated as appropriate: allergies, current medications, past family history, past medical history, past social history, past surgical history, and problem list.     Review of Systems   Constitutional: Negative for chills, fatigue, fever and unexpected weight change.   HENT: Negative for ear pain, hearing loss, sinus pressure, sore throat and tinnitus.    Eyes: Negative for pain, discharge and redness.   Respiratory: Negative for cough, shortness of breath and wheezing.    Cardiovascular: Negative for chest pain, palpitations and leg swelling.   Gastrointestinal: Negative for abdominal pain, constipation, diarrhea and nausea.   Endocrine: Negative for cold intolerance and heat intolerance.   Genitourinary: Positive for difficulty urinating. Negative for flank pain and urgency.   Musculoskeletal: Positive for back pain and myalgias. Negative for joint swelling.   Skin: Negative for rash and wound.   Allergic/Immunologic: Negative for environmental allergies and food allergies.   Neurological: Negative for dizziness, seizures,  numbness and headaches.   Hematological: Negative for adenopathy. Does not bruise/bleed easily.   Psychiatric/Behavioral: Negative for decreased concentration, dysphoric mood and sleep disturbance. The patient is not nervous/anxious.    All other systems reviewed and are negative.  I have reviewed and confirmed the accuracy of the ROS as documented by the MA/LPN/RN Sarah Marcano MD      Objective  Vitals:    01/07/21 1237   BP: 110/72   Pulse: 56   Temp: 97.9 °F (36.6 °C)   SpO2: 98%     Body mass index is 18.14 kg/m².       Physical Exam  Vitals signs reviewed.   Constitutional:       General: She is not in acute distress.     Appearance: She is well-developed.   HENT:      Head: Normocephalic and atraumatic.   Eyes:      General: No scleral icterus.        Right eye: No discharge.         Left eye: No discharge.      Conjunctiva/sclera: Conjunctivae normal.   Pulmonary:      Effort: Pulmonary effort is normal. No respiratory distress.   Abdominal:      Tenderness: There is no right CVA tenderness or left CVA tenderness.   Skin:     Coloration: Skin is not pale.      Findings: No erythema.   Neurological:      Mental Status: She is alert and oriented to person, place, and time.   Psychiatric:         Behavior: Behavior normal.         Thought Content: Thought content normal.         Judgment: Judgment normal.           Current Outpatient Medications:   •  CBD (cannabidiol) oral oil, Take  by mouth., Disp: , Rfl:   •  nitrofurantoin, macrocrystal-monohydrate, (MACROBID) 100 MG capsule, Take 1 capsule by mouth 2 (Two) Times a Day for 5 days., Disp: 10 capsule, Rfl: 0    Procedures    Office Visit on 01/07/2021   Component Date Value Ref Range Status   • Color 01/07/2021 Yellow  Yellow, Straw, Dark Yellow, [place] Final   • Clarity, UA 01/07/2021 Clear  Clear Final   • Specific Gravity  01/07/2021 1.030  1.005 - 1.030 Final   • pH, Urine 01/07/2021 6.5  5.0 - 8.0 Final   • Leukocytes 01/07/2021 Trace* Negative  Final   • Nitrite, UA 01/07/2021 Negative  Negative Final   • Protein, POC 01/07/2021 Negative  Negative mg/dL Final   • Glucose, UA 01/07/2021 Negative  Negative, 1000 mg/dL (3+) mg/dL Final   • Ketones, UA 01/07/2021 Negative  Negative Final   • Urobilinogen, UA 01/07/2021 Normal  Normal Final   • Bilirubin 01/07/2021 Negative  Negative Final   • Blood, UA 01/07/2021 Negative  Negative Final           Diagnoses and all orders for this visit:    1. Flank pain (Primary)  -     POC Urinalysis Dipstick, Automated  -     Urine Culture - Urine, Urine, Clean Catch  -     nitrofurantoin, macrocrystal-monohydrate, (MACROBID) 100 MG capsule; Take 1 capsule by mouth 2 (Two) Times a Day for 5 days.  Dispense: 10 capsule; Refill: 0    2. Decreased urination  -     POC Urinalysis Dipstick, Automated  -     Urine Culture - Urine, Urine, Clean Catch  -     nitrofurantoin, macrocrystal-monohydrate, (MACROBID) 100 MG capsule; Take 1 capsule by mouth 2 (Two) Times a Day for 5 days.  Dispense: 10 capsule; Refill: 0    3. Abnormal urine odor  -     POC Urinalysis Dipstick, Automated  -     Urine Culture - Urine, Urine, Clean Catch  -     nitrofurantoin, macrocrystal-monohydrate, (MACROBID) 100 MG capsule; Take 1 capsule by mouth 2 (Two) Times a Day for 5 days.  Dispense: 10 capsule; Refill: 0    Patient symptoms are most consistent with acute urinary tract infection.  Her urine dip did demonstrate some leukocytes.  Will send for culture for confirmation.  I am going to empirically start her on antibiotic therapy with nitrofurantoin.  Encourage patient to call me if her symptoms do not improve over the next couple days or if they worsen.    Patient did also request during her visit that she be referred to gynecology as she has family history of ovarian and uterine cancer.  I have placed referral order for her.    Return if symptoms worsen or fail to improve.      Sarah Marcano MD

## 2021-01-09 LAB
BACTERIA UR CULT: NO GROWTH
BACTERIA UR CULT: NORMAL

## 2021-01-15 ENCOUNTER — TRANSCRIBE ORDERS (OUTPATIENT)
Dept: SLEEP MEDICINE | Facility: HOSPITAL | Age: 36
End: 2021-01-15

## 2021-01-15 DIAGNOSIS — Z01.818 OTHER SPECIFIED PRE-OPERATIVE EXAMINATION: Primary | ICD-10-CM

## 2021-01-18 ENCOUNTER — LAB (OUTPATIENT)
Dept: LAB | Facility: HOSPITAL | Age: 36
End: 2021-01-18

## 2021-01-18 DIAGNOSIS — Z01.818 OTHER SPECIFIED PRE-OPERATIVE EXAMINATION: ICD-10-CM

## 2021-01-18 PROCEDURE — U0004 COV-19 TEST NON-CDC HGH THRU: HCPCS

## 2021-01-18 PROCEDURE — C9803 HOPD COVID-19 SPEC COLLECT: HCPCS

## 2021-01-19 LAB — SARS-COV-2 RNA RESP QL NAA+PROBE: NOT DETECTED

## 2021-01-20 ENCOUNTER — ANESTHESIA EVENT (OUTPATIENT)
Dept: GASTROENTEROLOGY | Facility: HOSPITAL | Age: 36
End: 2021-01-20

## 2021-01-20 ENCOUNTER — HOSPITAL ENCOUNTER (OUTPATIENT)
Facility: HOSPITAL | Age: 36
Setting detail: HOSPITAL OUTPATIENT SURGERY
Discharge: HOME | End: 2021-01-20
Attending: INTERNAL MEDICINE | Admitting: INTERNAL MEDICINE

## 2021-01-20 ENCOUNTER — ANESTHESIA (OUTPATIENT)
Dept: GASTROENTEROLOGY | Facility: HOSPITAL | Age: 36
End: 2021-01-20

## 2021-01-20 VITALS
HEIGHT: 65 IN | TEMPERATURE: 97.9 F | SYSTOLIC BLOOD PRESSURE: 105 MMHG | BODY MASS INDEX: 17.99 KG/M2 | DIASTOLIC BLOOD PRESSURE: 71 MMHG | HEART RATE: 76 BPM | RESPIRATION RATE: 17 BRPM | WEIGHT: 108 LBS | OXYGEN SATURATION: 100 %

## 2021-01-20 DIAGNOSIS — R79.0 LOW FERRITIN: ICD-10-CM

## 2021-01-20 DIAGNOSIS — R63.4 WEIGHT LOSS: ICD-10-CM

## 2021-01-20 LAB
B-HCG UR QL: NEGATIVE
INTERNAL NEGATIVE CONTROL: NEGATIVE
INTERNAL POSITIVE CONTROL: POSITIVE
Lab: NORMAL

## 2021-01-20 PROCEDURE — 81025 URINE PREGNANCY TEST: CPT | Performed by: INTERNAL MEDICINE

## 2021-01-20 PROCEDURE — 43239 EGD BIOPSY SINGLE/MULTIPLE: CPT | Performed by: INTERNAL MEDICINE

## 2021-01-20 PROCEDURE — 88305 TISSUE EXAM BY PATHOLOGIST: CPT | Performed by: INTERNAL MEDICINE

## 2021-01-20 PROCEDURE — 25010000002 PROPOFOL 10 MG/ML EMULSION: Performed by: ANESTHESIOLOGY

## 2021-01-20 PROCEDURE — 45378 DIAGNOSTIC COLONOSCOPY: CPT | Performed by: INTERNAL MEDICINE

## 2021-01-20 RX ORDER — PROPOFOL 10 MG/ML
VIAL (ML) INTRAVENOUS CONTINUOUS PRN
Status: DISCONTINUED | OUTPATIENT
Start: 2021-01-20 | End: 2021-01-20 | Stop reason: SURG

## 2021-01-20 RX ORDER — SODIUM CHLORIDE, SODIUM LACTATE, POTASSIUM CHLORIDE, CALCIUM CHLORIDE 600; 310; 30; 20 MG/100ML; MG/100ML; MG/100ML; MG/100ML
30 INJECTION, SOLUTION INTRAVENOUS CONTINUOUS
Status: DISCONTINUED | OUTPATIENT
Start: 2021-01-20 | End: 2021-01-20 | Stop reason: HOSPADM

## 2021-01-20 RX ORDER — PROPOFOL 10 MG/ML
VIAL (ML) INTRAVENOUS AS NEEDED
Status: DISCONTINUED | OUTPATIENT
Start: 2021-01-20 | End: 2021-01-20 | Stop reason: SURG

## 2021-01-20 RX ADMIN — PROPOFOL 50 MG: 10 INJECTION, EMULSION INTRAVENOUS at 14:12

## 2021-01-20 RX ADMIN — SODIUM CHLORIDE, POTASSIUM CHLORIDE, SODIUM LACTATE AND CALCIUM CHLORIDE 30 ML/HR: 600; 310; 30; 20 INJECTION, SOLUTION INTRAVENOUS at 14:06

## 2021-01-20 RX ADMIN — SODIUM CHLORIDE, POTASSIUM CHLORIDE, SODIUM LACTATE AND CALCIUM CHLORIDE: 600; 310; 30; 20 INJECTION, SOLUTION INTRAVENOUS at 14:11

## 2021-01-20 RX ADMIN — PROPOFOL 50 MG: 10 INJECTION, EMULSION INTRAVENOUS at 14:15

## 2021-01-20 RX ADMIN — PROPOFOL 160 MCG/KG/MIN: 10 INJECTION, EMULSION INTRAVENOUS at 14:20

## 2021-01-20 RX ADMIN — PROPOFOL 50 MG: 10 INJECTION, EMULSION INTRAVENOUS at 14:17

## 2021-01-20 NOTE — ANESTHESIA PREPROCEDURE EVALUATION
Anesthesia Evaluation     Patient summary reviewed and Nursing notes reviewed   NPO Solid Status: > 8 hours  NPO Liquid Status: > 2 hours           Airway   Mallampati: II  TM distance: >3 FB  Neck ROM: full  Dental - normal exam     Pulmonary - normal exam   (+) shortness of breath,   Cardiovascular - normal exam    (+) valvular problems/murmurs murmur,       Neuro/Psych  (+) headaches, psychiatric history Anxiety and Depression,     GI/Hepatic/Renal/Endo    (+)  hiatal hernia, GERD,      Musculoskeletal (-) negative ROS    Abdominal    Substance History - negative use     OB/GYN negative ob/gyn ROS         Other                        Anesthesia Plan    ASA 2     MAC       Anesthetic plan, all risks, benefits, and alternatives have been provided, discussed and informed consent has been obtained with: patient.

## 2021-01-20 NOTE — DISCHARGE INSTRUCTIONS
For the next 24 hours patient needs to be with a responsible adult.    For 24 hours DO NOT drive, operate machinery, appliances, drink alcohol, make important decisions or sign legal documents.    Start with a light or bland diet if you are feeling sick to your stomach otherwise advance to regular diet as tolerated.    Follow recommendations on procedure report if provided by your doctor.    Call Dr Hunter for problems [phone]    Problems may include but not limited to: large amounts of bleeding, trouble breathing, repeated vomiting, severe unrelieved pain, fever or chills.

## 2021-01-20 NOTE — ANESTHESIA POSTPROCEDURE EVALUATION
"Patient: Peggy Suero    Procedure Summary     Date: 01/20/21 Room / Location:  [facility]    Anesthesia Start: 1410 Anesthesia Stop: 1441    Procedures:       ESOPHAGOGASTRODUODENOSCOPY WITH BIOPSIES (N/A Esophagus)      COLONOSCOPY TO CECUM AND TI (N/A ) Diagnosis:       Weight loss      Low ferritin      (Weight loss [R63.4])      (Low ferritin [R79.0])    Surgeon: Tim Hunter MD Provider: Mau Butts MD    Anesthesia Type: MAC ASA Status: 2          Anesthesia Type: MAC    Vitals  No vitals data found for the desired time range.          Post Anesthesia Care and Evaluation    Patient location during evaluation: bedside  Patient participation: complete - patient participated  Level of consciousness: awake and alert  Pain management: adequate  Airway patency: patent  Anesthetic complications: No anesthetic complications    Cardiovascular status: acceptable  Respiratory status: acceptable  Hydration status: acceptable    Comments: /83   Pulse 76   Temp 36.6 °C (97.9 °F) (Oral)   Resp 16   Ht 165.1 cm (65\")   Wt 49 kg (108 lb)   LMP 01/12/2021   SpO2 100%   BMI 17.97 kg/m²       "

## 2021-01-23 LAB
LAB AP CASE REPORT: NORMAL
PATH REPORT.FINAL DX SPEC: NORMAL
PATH REPORT.GROSS SPEC: NORMAL

## 2021-01-25 ENCOUNTER — LAB (OUTPATIENT)
Dept: LAB | Facility: HOSPITAL | Age: 36
End: 2021-01-25

## 2021-01-25 ENCOUNTER — CONSULT (OUTPATIENT)
Dept: ONCOLOGY | Facility: CLINIC | Age: 36
End: 2021-01-25

## 2021-01-25 VITALS
TEMPERATURE: 98 F | BODY MASS INDEX: 18.16 KG/M2 | WEIGHT: 109 LBS | OXYGEN SATURATION: 100 % | SYSTOLIC BLOOD PRESSURE: 110 MMHG | HEIGHT: 65 IN | DIASTOLIC BLOOD PRESSURE: 75 MMHG | RESPIRATION RATE: 12 BRPM | HEART RATE: 59 BPM

## 2021-01-25 DIAGNOSIS — R79.0 LOW FERRITIN: Primary | ICD-10-CM

## 2021-01-25 DIAGNOSIS — D50.9 IRON DEFICIENCY ANEMIA, UNSPECIFIED IRON DEFICIENCY ANEMIA TYPE: ICD-10-CM

## 2021-01-25 DIAGNOSIS — E61.1 LOW IRON: Primary | ICD-10-CM

## 2021-01-25 LAB
BASOPHILS # BLD AUTO: 0.09 10*3/MM3 (ref 0–0.2)
BASOPHILS NFR BLD AUTO: 1.3 % (ref 0–1.5)
DEPRECATED RDW RBC AUTO: 43 FL (ref 37–54)
EOSINOPHIL # BLD AUTO: 0.32 10*3/MM3 (ref 0–0.4)
EOSINOPHIL NFR BLD AUTO: 4.6 % (ref 0.3–6.2)
ERYTHROCYTE [DISTWIDTH] IN BLOOD BY AUTOMATED COUNT: 14 % (ref 12.3–15.4)
FERRITIN SERPL-MCNC: 9.6 NG/ML (ref 11–207)
FOLATE SERPL-MCNC: 20 NG/ML (ref 4.78–24.2)
HCT VFR BLD AUTO: 34.6 % (ref 34–46.6)
HGB BLD-MCNC: 11.4 G/DL (ref 12–15.9)
IMM GRANULOCYTES # BLD AUTO: 0.04 10*3/MM3 (ref 0–0.05)
IMM GRANULOCYTES NFR BLD AUTO: 0.6 % (ref 0–0.5)
IRON 24H UR-MRATE: 33 MCG/DL (ref 37–145)
IRON SATN MFR SERPL: 8 % (ref 14–48)
LDH SERPL-CCNC: 175 U/L (ref 99–259)
LYMPHOCYTES # BLD AUTO: 2.61 10*3/MM3 (ref 0.7–3.1)
LYMPHOCYTES NFR BLD AUTO: 37.4 % (ref 19.6–45.3)
MCH RBC QN AUTO: 28.1 PG (ref 26.6–33)
MCHC RBC AUTO-ENTMCNC: 32.9 G/DL (ref 31.5–35.7)
MCV RBC AUTO: 85.2 FL (ref 79–97)
MONOCYTES # BLD AUTO: 0.6 10*3/MM3 (ref 0.1–0.9)
MONOCYTES NFR BLD AUTO: 8.6 % (ref 5–12)
NEUTROPHILS NFR BLD AUTO: 3.32 10*3/MM3 (ref 1.7–7)
NEUTROPHILS NFR BLD AUTO: 47.5 % (ref 42.7–76)
NRBC BLD AUTO-RTO: 0 /100 WBC (ref 0–0.2)
PLATELET # BLD AUTO: 225 10*3/MM3 (ref 140–450)
PMV BLD AUTO: 10.3 FL (ref 6–12)
RBC # BLD AUTO: 4.06 10*6/MM3 (ref 3.77–5.28)
TIBC SERPL-MCNC: 440 MCG/DL (ref 249–505)
TRANSFERRIN SERPL-MCNC: 314 MG/DL (ref 200–360)
VIT B12 BLD-MCNC: 692 PG/ML (ref 211–946)
WBC # BLD AUTO: 6.98 10*3/MM3 (ref 3.4–10.8)

## 2021-01-25 PROCEDURE — 36415 COLL VENOUS BLD VENIPUNCTURE: CPT

## 2021-01-25 PROCEDURE — 83540 ASSAY OF IRON: CPT | Performed by: INTERNAL MEDICINE

## 2021-01-25 PROCEDURE — 82728 ASSAY OF FERRITIN: CPT | Performed by: INTERNAL MEDICINE

## 2021-01-25 PROCEDURE — 82607 VITAMIN B-12: CPT | Performed by: INTERNAL MEDICINE

## 2021-01-25 PROCEDURE — 83615 LACTATE (LD) (LDH) ENZYME: CPT | Performed by: INTERNAL MEDICINE

## 2021-01-25 PROCEDURE — 82746 ASSAY OF FOLIC ACID SERUM: CPT | Performed by: INTERNAL MEDICINE

## 2021-01-25 PROCEDURE — 99244 OFF/OP CNSLTJ NEW/EST MOD 40: CPT | Performed by: INTERNAL MEDICINE

## 2021-01-25 PROCEDURE — 85025 COMPLETE CBC W/AUTO DIFF WBC: CPT

## 2021-01-25 PROCEDURE — 84466 ASSAY OF TRANSFERRIN: CPT | Performed by: INTERNAL MEDICINE

## 2021-01-25 RX ORDER — METHYLPREDNISOLONE SODIUM SUCCINATE 125 MG/2ML
125 INJECTION, POWDER, LYOPHILIZED, FOR SOLUTION INTRAMUSCULAR; INTRAVENOUS AS NEEDED
Status: CANCELLED | OUTPATIENT
Start: 2021-01-26

## 2021-01-25 RX ORDER — SODIUM CHLORIDE 9 MG/ML
250 INJECTION, SOLUTION INTRAVENOUS ONCE
Status: CANCELLED | OUTPATIENT
Start: 2021-01-26

## 2021-01-25 RX ORDER — PROCHLORPERAZINE MALEATE 10 MG
10 TABLET ORAL ONCE
Status: CANCELLED | OUTPATIENT
Start: 2021-01-26

## 2021-01-25 RX ORDER — DIPHENHYDRAMINE HYDROCHLORIDE 50 MG/ML
50 INJECTION INTRAMUSCULAR; INTRAVENOUS AS NEEDED
Status: CANCELLED | OUTPATIENT
Start: 2021-01-26

## 2021-01-25 NOTE — PROGRESS NOTES
Subjective     REASON FOR CONSULTATION:  Iron deficiency anemia  Provide an opinion on any further workup or treatment                             REQUESTING PRACTITIONER:  Hank    RECORDS OBTAINED:  Records of the patients history including those obtained from the referring provider were reviewed and summarized in detail.    HISTORY OF PRESENT ILLNESS:  The patient is a 35 y.o. year old female who is here for an opinion about the above issue.    History of Present Illness   This is a 35-year-old lady referred from her primary care provider for evaluation and treatment of iron deficiency anemia.  The patient complains of fatigue.  Reviewing her labs on 7/20/2020 she developed a new anemia with hemoglobin 11.9 and a repeat CBC 12/16/2020 showed again hemoglobin 11.9 with MCV 85.5.  Her white blood cell and platelet count were normal.  Iron studies performed 12/16/2020 showed a ferritin of 6.4, iron saturation 5% and a TIBC 548.  B12 and folic acid levels 7/20/2020 were normal 479 and 8.86 respectively.  A CMP on 7/20/2020 showed a normal serum creatinine 0.92 and a normal total protein 6.6.    The patient was seen by gastroenterology when 89 for evaluation of iron deficiency anemia and also complains of fluctuating bowel habits and weight loss (records reviewed).  She underwent EGD and colonoscopy on 1/20/2021.  EGD showed normal esophagus and gastritis; the colon and examined portion of the ileum were normal.    The patient reports somewhat heavy menstrual cycles.  She has attempted to take multivitamins with iron in the past but could not tolerate secondary to significant nausea and constipation.    She complains of fatigue, alopecia and weight loss.  She is undergoing additional evaluation of weight loss and trouble with swallowing per her GI physician.  She states that she has been checked for celiac disease and negative.        Past Medical History:   Diagnosis Date   • Abdominal pain    • Anxiety    •  Depression    • GERD (gastroesophageal reflux disease)    • Hernia, hiatal    • Migraine    • Murmur, cardiac    • Ulcer of abdomen wall (CMS/HCC) 2007        Past Surgical History:   Procedure Laterality Date   • COLONOSCOPY     • COLONOSCOPY W/ POLYPECTOMY N/A 1/20/2021    Procedure: COLONOSCOPY TO CECUM AND TI;  Surgeon: Tim Hunter MD;  Location: [facility];  Service: Gastroenterology;  Laterality: N/A;  PRE- WEIGHT LOSS  POST- NORMAL   • DENTAL PROCEDURE     • ENDOSCOPY N/A 6/22/2016    Procedure: ESOPHAGOGASTRODUODENOSCOPY WITH BIOPSIES;  Surgeon: Tim Hunter MD;  Location: [facility] ENDOSCOPY;  Service:    • ENDOSCOPY N/A 1/20/2021    Procedure: ESOPHAGOGASTRODUODENOSCOPY WITH BIOPSIES;  Surgeon: Tim Hunter MD;  Location: [facility] ENDOSCOPY;  Service: Gastroenterology;  Laterality: N/A;  PRE- WEIGHT LOSS  POST- EROSIVE GASTRITIS     • EYE SURGERY Left 1986    STAUBISMIS   • LAPAROSCOPIC TUBAL LIGATION  2012        Current Outpatient Medications on File Prior to Visit   Medication Sig Dispense Refill   • CBD (cannabidiol) oral oil Take  by mouth Every Night.       No current facility-administered medications on file prior to visit.         ALLERGIES:  No Known Allergies     Social History     Socioeconomic History   • Marital status:      Spouse name: Dale   • Number of children: Not on file   • Years of education: Not on file   • Highest education level: Not on file   Occupational History     Employer: [employer]   Tobacco Use   • Smoking status: Never Smoker   • Smokeless tobacco: Never Used   Substance and Sexual Activity   • Alcohol use: Yes     Comment: social   • Drug use: No   • Sexual activity: Defer        Family History   Problem Relation Age of Onset   • Diabetes Mother    • Migraines Mother    • Dementia Mother    • Hypertension Mother    • Thyroid disease Mother    • Asthma Father    • Hyperlipidemia Father    • Hypertension Father    • Cervical cancer  "Maternal Grandmother    • Dementia Maternal Grandmother    • Uterine cancer Paternal Grandmother    • Dementia Paternal Grandmother    • Stroke Paternal Grandmother    • Hypertension Paternal Grandmother    • Heart disease Paternal Grandmother    • Diabetes Paternal Grandmother    • Thyroid disease Paternal Grandmother    • Kidney disease Paternal Grandmother    • Heart disease Maternal Grandfather    • Heart disease Paternal Grandfather    • Esophageal cancer Paternal Grandfather    • Colon polyps Maternal Uncle    • Malig Hyperthermia Neg Hx         Review of Systems   Constitutional: Positive for fatigue and unexpected weight change. Negative for appetite change, diaphoresis and fever.   HENT: Positive for trouble swallowing. Negative for congestion.    Eyes: Negative.    Respiratory: Negative.    Cardiovascular: Negative.    Gastrointestinal: Positive for abdominal pain, constipation and nausea.   Genitourinary: Positive for menstrual problem.   Musculoskeletal: Negative.    Skin: Negative.    Allergic/Immunologic: Negative.    Hematological: Negative.    Psychiatric/Behavioral: Negative.         Objective     Vitals:    01/25/21 0952   BP: 110/75   Pulse: 59   Resp: 12   Temp: 98 °F (36.7 °C)   TempSrc: Infrared   SpO2: 100%   Weight: 49.4 kg (109 lb)   Height: 165.1 cm (65\")   PainSc: 0-No pain     Current Status 1/25/2021   ECOG score 0       Physical Exam    CONSTITUTIONAL: pleasant thin young woman  HEENT: no icterus, no thrush, moist membranes  NECK: no jvd  LYMPH: no cervical or supraclavicular lad  CV: RRR, S1S2, no murmur  RESP: cta bilat, no wheezing, no rales  GI: soft, non-tender, no splenomegaly, +bs  MUSC: no edema, normal gait  NEURO: alert and oriented x3, normal strength  PSYCH: normal mood and affect  SKIN: tattoos present, no jaundice    RECENT LABS:  Hematology WBC   Date Value Ref Range Status   01/25/2021 6.98 3.40 - 10.80 10*3/mm3 Final   12/16/2020 6.29 3.40 - 10.80 10*3/mm3 Final     RBC "   Date Value Ref Range Status   01/25/2021 4.06 3.77 - 5.28 10*6/mm3 Final   12/16/2020 4.34 3.77 - 5.28 10*6/mm3 Final     Hemoglobin   Date Value Ref Range Status   01/25/2021 11.4 (L) 12.0 - 15.9 g/dL Final     Hematocrit   Date Value Ref Range Status   01/25/2021 34.6 34.0 - 46.6 % Final     Platelets   Date Value Ref Range Status   01/25/2021 225 140 - 450 10*3/mm3 Final          Assessment/Plan   1.  Iron deficiency: The patient had recent labs with her primary care provider showing a ferritin 6.4 and iron saturation 5% consistent with significant iron deficiency.  Etiology of iron deficiency most likely related to her menstrual cycles plus possible malabsorption given her GI symptoms and weight loss.  She reports negative testing for celiac disease.  Recent EGD and colonoscopy were negative.  She does not tolerate oral iron secondary to nausea and constipation.  2.  Normocytic anemia: She likely has a contribution of iron deficiency to anemia.  Her red cells are normochromic and normocytic so I will recheck her B12 and folate also  3.  Intolerance to oral iron products secondary to GI upset    Hematology recommendations:  1.  Recheck ferritin, iron profile, B12 and folate along with LDH  2.  Given her intolerance to oral iron I recommended IV iron replacement with 2 doses of Injectafer.  I discussed with the patient possible risk of infusion reactions and nausea.  She was agreeable to proceed.  3.  We will recheck her CBC, ferritin and iron profile 3 months

## 2021-01-27 ENCOUNTER — INFUSION (OUTPATIENT)
Dept: ONCOLOGY | Facility: HOSPITAL | Age: 36
End: 2021-01-27

## 2021-01-27 VITALS
HEART RATE: 87 BPM | TEMPERATURE: 98.8 F | OXYGEN SATURATION: 99 % | WEIGHT: 108.6 LBS | DIASTOLIC BLOOD PRESSURE: 67 MMHG | RESPIRATION RATE: 16 BRPM | SYSTOLIC BLOOD PRESSURE: 112 MMHG | BODY MASS INDEX: 18.07 KG/M2

## 2021-01-27 DIAGNOSIS — D50.9 IRON DEFICIENCY ANEMIA, UNSPECIFIED IRON DEFICIENCY ANEMIA TYPE: Primary | ICD-10-CM

## 2021-01-27 PROCEDURE — 63710000001 PROCHLORPERAZINE MALEATE PER 10 MG: Performed by: INTERNAL MEDICINE

## 2021-01-27 PROCEDURE — 96374 THER/PROPH/DIAG INJ IV PUSH: CPT

## 2021-01-27 PROCEDURE — 25010000002 FERRIC CARBOXYMALTOSE 750 MG/15ML SOLUTION 15 ML VIAL: Performed by: INTERNAL MEDICINE

## 2021-01-27 RX ORDER — DIPHENHYDRAMINE HYDROCHLORIDE 50 MG/ML
50 INJECTION INTRAMUSCULAR; INTRAVENOUS AS NEEDED
Status: CANCELLED | OUTPATIENT
Start: 2021-02-03

## 2021-01-27 RX ORDER — SODIUM CHLORIDE 9 MG/ML
250 INJECTION, SOLUTION INTRAVENOUS ONCE
Status: CANCELLED | OUTPATIENT
Start: 2021-02-03

## 2021-01-27 RX ORDER — METHYLPREDNISOLONE SODIUM SUCCINATE 125 MG/2ML
125 INJECTION, POWDER, LYOPHILIZED, FOR SOLUTION INTRAMUSCULAR; INTRAVENOUS AS NEEDED
Status: CANCELLED | OUTPATIENT
Start: 2021-02-03

## 2021-01-27 RX ORDER — SODIUM CHLORIDE 9 MG/ML
250 INJECTION, SOLUTION INTRAVENOUS ONCE
Status: COMPLETED | OUTPATIENT
Start: 2021-01-27 | End: 2021-01-27

## 2021-01-27 RX ORDER — PROCHLORPERAZINE MALEATE 10 MG
10 TABLET ORAL ONCE
Status: CANCELLED | OUTPATIENT
Start: 2021-02-03

## 2021-01-27 RX ORDER — PROCHLORPERAZINE MALEATE 10 MG
10 TABLET ORAL ONCE
Status: COMPLETED | OUTPATIENT
Start: 2021-01-27 | End: 2021-01-27

## 2021-01-27 RX ADMIN — FERRIC CARBOXYMALTOSE INJECTION 750 MG: 50 INJECTION, SOLUTION INTRAVENOUS at 15:34

## 2021-01-27 RX ADMIN — SODIUM CHLORIDE 250 ML: 9 INJECTION, SOLUTION INTRAVENOUS at 15:25

## 2021-01-27 RX ADMIN — PROCHLORPERAZINE MALEATE 10 MG: 10 TABLET ORAL at 15:25

## 2021-02-03 ENCOUNTER — INFUSION (OUTPATIENT)
Dept: ONCOLOGY | Facility: HOSPITAL | Age: 36
End: 2021-02-03

## 2021-02-03 VITALS
TEMPERATURE: 97.1 F | RESPIRATION RATE: 16 BRPM | OXYGEN SATURATION: 99 % | WEIGHT: 107.2 LBS | SYSTOLIC BLOOD PRESSURE: 108 MMHG | DIASTOLIC BLOOD PRESSURE: 71 MMHG | HEART RATE: 75 BPM | BODY MASS INDEX: 17.84 KG/M2

## 2021-02-03 DIAGNOSIS — D50.9 IRON DEFICIENCY ANEMIA, UNSPECIFIED IRON DEFICIENCY ANEMIA TYPE: Primary | ICD-10-CM

## 2021-02-03 PROCEDURE — 96374 THER/PROPH/DIAG INJ IV PUSH: CPT

## 2021-02-03 PROCEDURE — 25010000002 FERRIC CARBOXYMALTOSE 750 MG/15ML SOLUTION 15 ML VIAL: Performed by: INTERNAL MEDICINE

## 2021-02-03 PROCEDURE — 63710000001 PROCHLORPERAZINE MALEATE PER 10 MG: Performed by: INTERNAL MEDICINE

## 2021-02-03 RX ORDER — PROCHLORPERAZINE MALEATE 10 MG
10 TABLET ORAL ONCE
Status: CANCELLED | OUTPATIENT
Start: 2021-02-10

## 2021-02-03 RX ORDER — PROCHLORPERAZINE MALEATE 10 MG
10 TABLET ORAL ONCE
Status: COMPLETED | OUTPATIENT
Start: 2021-02-03 | End: 2021-02-03

## 2021-02-03 RX ORDER — SODIUM CHLORIDE 9 MG/ML
250 INJECTION, SOLUTION INTRAVENOUS ONCE
Status: CANCELLED | OUTPATIENT
Start: 2021-02-10

## 2021-02-03 RX ORDER — METHYLPREDNISOLONE SODIUM SUCCINATE 125 MG/2ML
125 INJECTION, POWDER, LYOPHILIZED, FOR SOLUTION INTRAMUSCULAR; INTRAVENOUS AS NEEDED
OUTPATIENT
Start: 2021-02-10

## 2021-02-03 RX ORDER — SODIUM CHLORIDE 9 MG/ML
250 INJECTION, SOLUTION INTRAVENOUS ONCE
Status: COMPLETED | OUTPATIENT
Start: 2021-02-03 | End: 2021-02-03

## 2021-02-03 RX ORDER — DIPHENHYDRAMINE HYDROCHLORIDE 50 MG/ML
50 INJECTION INTRAMUSCULAR; INTRAVENOUS AS NEEDED
OUTPATIENT
Start: 2021-02-10

## 2021-02-03 RX ADMIN — SODIUM CHLORIDE 250 ML: 9 INJECTION, SOLUTION INTRAVENOUS at 15:21

## 2021-02-03 RX ADMIN — FERRIC CARBOXYMALTOSE INJECTION 750 MG: 50 INJECTION, SOLUTION INTRAVENOUS at 15:21

## 2021-02-03 RX ADMIN — PROCHLORPERAZINE MALEATE 10 MG: 10 TABLET ORAL at 15:12

## 2021-02-10 ENCOUNTER — TELEPHONE (OUTPATIENT)
Dept: GASTROENTEROLOGY | Facility: CLINIC | Age: 36
End: 2021-02-10

## 2021-02-10 DIAGNOSIS — R10.9 ABDOMINAL PAIN, UNSPECIFIED ABDOMINAL LOCATION: ICD-10-CM

## 2021-02-10 DIAGNOSIS — R63.4 WEIGHT LOSS: Primary | ICD-10-CM

## 2021-02-10 NOTE — TELEPHONE ENCOUNTER
----- Message from Tim Hunter MD sent at 2/7/2021  9:51 AM EST -----  EGD Pathology Results:  Normal - no signs of celiac disease. No h pylori    Recommendations:  CT scan A/P with IV and oral contrast - indication is abdominal pain and weight loss    Office f/u: 2 weeks after CT scan with Cami

## 2021-02-10 NOTE — TELEPHONE ENCOUNTER
Called pt and advised of Dr Hunter's note.  Pt verbalized understanding.     Order placed for CT.    Pt will call back to schedule f/u appt.    Msg sent to Dr Hunter to cosign.

## 2021-02-24 ENCOUNTER — TELEPHONE (OUTPATIENT)
Dept: GASTROENTEROLOGY | Facility: CLINIC | Age: 36
End: 2021-02-24

## 2021-02-24 NOTE — TELEPHONE ENCOUNTER
Returned phone call to [employer] [phone].Spoke with Enti. She states patient's insurance is not in network and ask if Dr. Hunter had privileges at [facility], advised no.   Ed states the patient can go to [facility] for her CT scan and it may be a cheaper oop for the patient.   Approval for CT scan at [facility] is from 02/24/21-8/24/21. Reference number: [id number].   Patient called advised of the above. Patient states she never called her insurance and will call them regarding the above. She states once she has an answer she will call back.

## 2021-02-24 NOTE — TELEPHONE ENCOUNTER
----- Message from Vivek Busby sent at 2/24/2021 12:02 PM EST -----  Regarding: CT w/Dr. Dale Harvey from [facility]  [phone] called regarding CT w/Dr. Hunter.   States pt's insurance has a high OOP expense for the [facility] facility that pt does not want to pay and is asking if Dr. Hunter has privileges at any of the [facility]. Asking for nurse to call her back. Ref# [id number]  Thanks!

## 2021-03-01 ENCOUNTER — TELEPHONE (OUTPATIENT)
Dept: GASTROENTEROLOGY | Facility: CLINIC | Age: 36
End: 2021-03-01

## 2021-03-01 NOTE — TELEPHONE ENCOUNTER
PT is calling due to Scan being cancelled and is unaware why, please advise  Called pt back. LM on VM to call back. Per  she is scheduled at [facility].

## 2021-03-03 ENCOUNTER — APPOINTMENT (OUTPATIENT)
Dept: CT IMAGING | Facility: HOSPITAL | Age: 36
End: 2021-03-03

## 2021-04-07 ENCOUNTER — OFFICE VISIT (OUTPATIENT)
Dept: OBSTETRICS AND GYNECOLOGY | Facility: CLINIC | Age: 36
End: 2021-04-07

## 2021-04-07 VITALS
DIASTOLIC BLOOD PRESSURE: 78 MMHG | WEIGHT: 110 LBS | SYSTOLIC BLOOD PRESSURE: 108 MMHG | HEIGHT: 65 IN | BODY MASS INDEX: 18.33 KG/M2

## 2021-04-07 DIAGNOSIS — N92.0 MENORRHAGIA WITH REGULAR CYCLE: ICD-10-CM

## 2021-04-07 DIAGNOSIS — N76.1 SUBACUTE VAGINITIS: Primary | ICD-10-CM

## 2021-04-07 DIAGNOSIS — Z80.41 FAMILY HISTORY OF OVARIAN CANCER: ICD-10-CM

## 2021-04-07 DIAGNOSIS — Z11.51 ENCOUNTER FOR SCREENING FOR HUMAN PAPILLOMAVIRUS (HPV): ICD-10-CM

## 2021-04-07 DIAGNOSIS — N94.2 VAGINISMUS: ICD-10-CM

## 2021-04-07 DIAGNOSIS — Z01.419 PAP SMEAR, LOW-RISK: ICD-10-CM

## 2021-04-07 PROCEDURE — 99385 PREV VISIT NEW AGE 18-39: CPT | Performed by: OBSTETRICS & GYNECOLOGY

## 2021-04-07 PROCEDURE — 99214 OFFICE O/P EST MOD 30 MIN: CPT | Performed by: OBSTETRICS & GYNECOLOGY

## 2021-04-07 NOTE — PROGRESS NOTES
GYN Annual Exam     CC- Here for annual exam.     Peggy Suero is a 36 y.o. female new patient who presents for annual well woman exam. Periods are regular every 26-28 days, lasting 5 days. Her cycles are heavy. She also has increase in vaginal discharge that is odorous and itchy. She also has pain with sex and night sweats.    OB History        2    Para   2    Term   2            AB        Living   2       SAB        TAB        Ectopic        Molar        Multiple        Live Births              Obstetric Comments   2              Menarche: 12  Current contraception: tubal ligation  History of abnormal Pap smear: no  History of abnormal mammogram: no  Family history of uterine, colon or ovarian cancer: yes - PGM uterine cancer, MGM ovarian cancer, great uncle with colon cancer  Family history of breast cancer: no  H/o STDs: none  Last pap:2016  Gardasil:missed  DEREK: none    Health Maintenance   Topic Date Due   • COVID-19 Vaccine (1) Never done   • HEPATITIS C SCREENING  Never done   • ANNUAL PHYSICAL  2017   • Annual Gynecologic Pelvic and Breast Exam  2017   • PAP SMEAR  2019   • TDAP/TD VACCINES (2 - Td) 2021   • INFLUENZA VACCINE  2021   • Pneumococcal Vaccine 0-64  Aged Out       Past Medical History:   Diagnosis Date   • Abdominal pain    • Anemia    • Anxiety    • Depression    • GERD (gastroesophageal reflux disease)    • Hernia, hiatal    • Migraine    • Murmur, cardiac    • Peptic ulceration    • Ulcer of abdomen wall (CMS/HCC)        Past Surgical History:   Procedure Laterality Date   • COLONOSCOPY     • COLONOSCOPY W/ POLYPECTOMY N/A 2021    Procedure: COLONOSCOPY TO CECUM AND TI;  Surgeon: Tim Hunter MD;  Location: [facility] ENDOSCOPY;  Service: Gastroenterology;  Laterality: N/A;  PRE- WEIGHT LOSS  POST- NORMAL   • DENTAL PROCEDURE     • ENDOSCOPY N/A 2016    Procedure: ESOPHAGOGASTRODUODENOSCOPY WITH BIOPSIES;  Surgeon: Tim  Dilan Hunter MD;  Location: [facility] ENDOSCOPY;  Service:    • ENDOSCOPY N/A 1/20/2021    Procedure: ESOPHAGOGASTRODUODENOSCOPY WITH BIOPSIES;  Surgeon: Tim Hunter MD;  Location: [facility] ENDOSCOPY;  Service: Gastroenterology;  Laterality: N/A;  PRE- WEIGHT LOSS  POST- EROSIVE GASTRITIS     • EYE SURGERY Left 1986    STAUBISMIS   • LAPAROSCOPIC TUBAL LIGATION  2012   • TUBAL ABDOMINAL LIGATION  2011   • WISDOM TOOTH EXTRACTION           Current Outpatient Medications:   •  CBD (cannabidiol) oral oil, Take  by mouth Every Night., Disp: , Rfl:   •  rizatriptan MLT (Maxalt-MLT) 10 MG disintegrating tablet, Place 1 tablet on the tongue 1 (One) Time As Needed for Migraine for up to 1 dose. May repeat in 2 hours if needed, Disp: 9 tablet, Rfl: 0    No Known Allergies    Social History     Tobacco Use   • Smoking status: Never Smoker   • Smokeless tobacco: Never Used   Substance Use Topics   • Alcohol use: Yes     Comment: social   • Drug use: No       Family History   Problem Relation Age of Onset   • Diabetes Mother    • Migraines Mother    • Dementia Mother    • Hypertension Mother    • Thyroid disease Mother    • Hyperlipidemia Mother    • Asthma Father    • Hyperlipidemia Father    • Hypertension Father    • Cervical cancer Maternal Grandmother    • Dementia Maternal Grandmother    • Ovarian cancer Maternal Grandmother    • Uterine cancer Paternal Grandmother    • Dementia Paternal Grandmother    • Stroke Paternal Grandmother    • Hypertension Paternal Grandmother    • Heart disease Paternal Grandmother    • Diabetes Paternal Grandmother    • Thyroid disease Paternal Grandmother    • Kidney disease Paternal Grandmother    • Heart disease Maternal Grandfather    • Heart disease Paternal Grandfather    • Esophageal cancer Paternal Grandfather    • Colon polyps Maternal Uncle    • Colon cancer Other    • Malig Hyperthermia Neg Hx    • Breast cancer Neg Hx    • Pulmonary embolism Neg Hx    • Deep vein  "thrombosis Neg Hx        Review of Systems   Constitutional: Positive for activity change. Negative for appetite change, fatigue, fever and unexpected weight change.   Eyes: Negative for photophobia and visual disturbance.   Respiratory: Negative for cough and shortness of breath.    Cardiovascular: Negative for chest pain and palpitations.   Gastrointestinal: Negative for abdominal distention, abdominal pain, constipation, diarrhea and nausea.   Endocrine: Positive for heat intolerance. Negative for cold intolerance.   Genitourinary: Positive for dyspareunia, menstrual problem and vaginal discharge. Negative for dysuria and pelvic pain.   Musculoskeletal: Negative for back pain.   Skin: Negative for color change and rash.   Neurological: Negative for headaches.   Hematological: Negative for adenopathy. Does not bruise/bleed easily.   Psychiatric/Behavioral: Negative for dysphoric mood. The patient is not nervous/anxious.        /78   Ht 165.1 cm (65\")   Wt 49.9 kg (110 lb)   LMP 03/31/2021 (Exact Date)   BMI 18.30 kg/m²     Physical Exam  Vitals and nursing note reviewed.   Constitutional:       Appearance: Normal appearance. She is well-developed and normal weight.   HENT:      Head: Normocephalic and atraumatic.   Eyes:      General: No scleral icterus.     Conjunctiva/sclera: Conjunctivae normal.   Neck:      Thyroid: No thyromegaly.   Cardiovascular:      Rate and Rhythm: Normal rate and regular rhythm.   Pulmonary:      Effort: Pulmonary effort is normal.      Breath sounds: Normal breath sounds.   Chest:      Breasts:         Right: No swelling, bleeding, inverted nipple, mass, nipple discharge, skin change or tenderness.         Left: No swelling, bleeding, inverted nipple, mass, nipple discharge, skin change or tenderness.   Abdominal:      General: Bowel sounds are normal. There is no distension.      Palpations: Abdomen is soft. There is no mass.      Tenderness: There is no abdominal " tenderness. There is no guarding or rebound.      Hernia: No hernia is present.   Genitourinary:     Exam position: Supine.      Labia:         Right: No rash, tenderness, lesion or injury.         Left: No rash, tenderness, lesion or injury.       Urethra: No prolapse, urethral pain, urethral swelling or urethral lesion.      Vagina: No signs of injury and foreign body. Tenderness present. No vaginal discharge, erythema or bleeding.      Cervix: Discharge present. No cervical motion tenderness, friability, lesion or cervical bleeding.      Uterus: Not deviated, not enlarged, not fixed and not tender.       Adnexa:         Right: No mass, tenderness or fullness.          Left: No mass, tenderness or fullness.        Comments: + LPS  Scant discharge in vault  Musculoskeletal:      Cervical back: Neck supple.   Skin:     General: Skin is warm and dry.   Neurological:      Mental Status: She is alert and oriented to person, place, and time.   Psychiatric:         Mood and Affect: Mood normal.         Behavior: Behavior normal.         Thought Content: Thought content normal.         Judgment: Judgment normal.            Assessment/Plan    1) GYN HM: pap/HPV  SBE demonstrated and encouraged.  2) STD screening: declines Condoms encouraged.  3) Contraception: tubal ligation  4) Family Planning: family planning: childbearing completed, encourage folic acid daily  5) Diet and Exercise discussed  6) Smoking Status: No  7) Family history ovarian cancer- Based on the patient's family history of ovarian cancer, she  qualifies for genetic screening for the breast and ovarian cancer gene, or BRCA 1 & 2.  We offer this screening through our office and all testing is optional.  The test is offered through [facility].   We discussed risks, benefits and alternatives of the testing and the patient is not interested.  We discussed results that results include positive, negative and uncertain results.  All results, both positive, negative  and uncertain should be reviewed in the office in 4-6 weeks.  For those patients with elevated risk of breast cancer but negative BRCA results, a breast cancer risk assessment through [facility] will be calculated and anyone with a lifetime risk is greater than 20% will be offered MRI in addition to yearly mammograms.  These test is optional.  Patient was offered referral to genetic counselor and was not interested. The patient was given written information about the test. Her test was not drawn today.   8) MMG- plan age 39  9)Vaginitis- check NuSwab Y/M/B/L  10) Vaginismus- refer pelvic floor PT EP  11) Menorrhagia- schedule TVUS and endo bx in the next 4 weeks. HgB was 11.4 in 1/2021  12) I saw the patient with a face mask, gloves and eye protection  The patient herself was masked.  Social distancing was observed as appropriate.  13)Follow 4 weeks TVUS and endo bx or 1 year       Diagnoses and all orders for this visit:    1. Subacute vaginitis (Primary)  -     POC Urinalysis Dipstick  -     POC Pregnancy, Urine  -     NuSwab VG+ - Swab, Vagina  -     Genital Mycoplasmas ERLIN, Swab - Swab, Vagina    2. Pap smear, low-risk  -     IgP, Aptima HPV    3. Encounter for screening for human papillomavirus (HPV)  -     IgP, Aptima HPV    4. Vaginismus  -     Ambulatory Referral to Physical Therapy Pelvic Floor    5. Family history of ovarian cancer    6. Menorrhagia with regular cycle          Isabel Kline MD  04/07/2021    20:16 EDT

## 2021-04-09 ENCOUNTER — OFFICE VISIT (OUTPATIENT)
Dept: FAMILY MEDICINE CLINIC | Facility: CLINIC | Age: 36
End: 2021-04-09

## 2021-04-09 VITALS
OXYGEN SATURATION: 99 % | SYSTOLIC BLOOD PRESSURE: 98 MMHG | HEART RATE: 88 BPM | BODY MASS INDEX: 18.36 KG/M2 | TEMPERATURE: 99.6 F | HEIGHT: 65 IN | WEIGHT: 110.2 LBS | DIASTOLIC BLOOD PRESSURE: 60 MMHG

## 2021-04-09 DIAGNOSIS — G43.009 MIGRAINE WITHOUT AURA AND WITHOUT STATUS MIGRAINOSUS, NOT INTRACTABLE: Primary | ICD-10-CM

## 2021-04-09 PROBLEM — R06.02 SOB (SHORTNESS OF BREATH): Status: RESOLVED | Noted: 2020-08-10 | Resolved: 2021-04-09

## 2021-04-09 LAB
CYTOLOGIST CVX/VAG CYTO: NORMAL
CYTOLOGY CVX/VAG DOC CYTO: NORMAL
CYTOLOGY CVX/VAG DOC THIN PREP: NORMAL
DX ICD CODE: NORMAL
HIV 1 & 2 AB SER-IMP: NORMAL
HPV I/H RISK 4 DNA CVX QL PROBE+SIG AMP: NEGATIVE
OTHER STN SPEC: NORMAL
STAT OF ADQ CVX/VAG CYTO-IMP: NORMAL

## 2021-04-09 PROCEDURE — 96372 THER/PROPH/DIAG INJ SC/IM: CPT | Performed by: NURSE PRACTITIONER

## 2021-04-09 PROCEDURE — 99213 OFFICE O/P EST LOW 20 MIN: CPT | Performed by: NURSE PRACTITIONER

## 2021-04-09 RX ORDER — RIZATRIPTAN BENZOATE 10 MG/1
10 TABLET, ORALLY DISINTEGRATING ORAL ONCE AS NEEDED
Qty: 9 TABLET | Refills: 0 | Status: SHIPPED | OUTPATIENT
Start: 2021-04-09 | End: 2021-05-12

## 2021-04-09 RX ORDER — KETOROLAC TROMETHAMINE 30 MG/ML
60 INJECTION, SOLUTION INTRAMUSCULAR; INTRAVENOUS ONCE
Status: COMPLETED | OUTPATIENT
Start: 2021-04-09 | End: 2021-04-09

## 2021-04-09 RX ADMIN — KETOROLAC TROMETHAMINE 60 MG: 30 INJECTION, SOLUTION INTRAMUSCULAR; INTRAVENOUS at 14:04

## 2021-04-09 NOTE — PROGRESS NOTES
"Chief Complaint  Migraine (Patient has had a migraine for 4 days ( wore mask and goggles) )    Subjective          Peggy Suero presents to [facility]  History of Present Illness  Patient presenting with a migraine for the past 4 days.  She is not had any abortive medications to use at home but she has tried CBD oil and Advil which has not helped.  She does have a strong history of migraines and this headache is presenting the exact same way her migraines always do.  She does have light sensitivity and noise sensitivity.  Objective   Vital Signs:   BP 98/60   Pulse 88   Temp 99.6 °F (37.6 °C)   Ht 165.1 cm (65\")   Wt 50 kg (110 lb 3.2 oz)   SpO2 99%   BMI 18.34 kg/m²     Physical Exam  Vitals reviewed.   Constitutional:       General: She is not in acute distress.     Appearance: She is well-developed.   HENT:      Head: Normocephalic.   Cardiovascular:      Rate and Rhythm: Normal rate and regular rhythm.      Heart sounds: Normal heart sounds.   Pulmonary:      Effort: Pulmonary effort is normal.      Breath sounds: Normal breath sounds.   Neurological:      Mental Status: She is alert and oriented to person, place, and time.      Gait: Gait normal.   Psychiatric:         Behavior: Behavior normal.         Thought Content: Thought content normal.         Judgment: Judgment normal.        Result Review :                 Assessment and Plan    Diagnoses and all orders for this visit:    1. Migraine without aura and without status migrainosus, not intractable (Primary)  -     rizatriptan MLT (Maxalt-MLT) 10 MG disintegrating tablet; Place 1 tablet on the tongue 1 (One) Time As Needed for Migraine for up to 1 dose. May repeat in 2 hours if needed  Dispense: 9 tablet; Refill: 0        Follow Up {Instructions Charge Capture  Follow-up Communications :23}  No follow-ups on file.  Patient was given instructions and counseling regarding her condition or for health maintenance " advice. Please see specific information pulled into the AVS if appropriate.     Sending in Maxalt for future use.  Also can give her a Toradol shot today.  Return to the office as needed.    Mask and googles worn

## 2021-04-10 LAB
A VAGINAE DNA VAG QL NAA+PROBE: NORMAL SCORE
BVAB2 DNA VAG QL NAA+PROBE: NORMAL SCORE
C ALBICANS DNA VAG QL NAA+PROBE: NEGATIVE
C GLABRATA DNA VAG QL NAA+PROBE: NEGATIVE
C TRACH DNA VAG QL NAA+PROBE: NEGATIVE
M GENITALIUM DNA SPEC QL NAA+PROBE: NEGATIVE
M HOMINIS DNA SPEC QL NAA+PROBE: NEGATIVE
MEGA1 DNA VAG QL NAA+PROBE: NORMAL SCORE
N GONORRHOEA DNA VAG QL NAA+PROBE: NEGATIVE
T VAGINALIS DNA VAG QL NAA+PROBE: NEGATIVE
UREAPLASMA DNA SPEC QL NAA+PROBE: NEGATIVE

## 2021-04-16 ENCOUNTER — BULK ORDERING (OUTPATIENT)
Dept: CASE MANAGEMENT | Facility: OTHER | Age: 36
End: 2021-04-16

## 2021-04-16 ENCOUNTER — APPOINTMENT (OUTPATIENT)
Dept: VACCINE CLINIC | Facility: HOSPITAL | Age: 36
End: 2021-04-16

## 2021-04-16 DIAGNOSIS — Z23 IMMUNIZATION DUE: ICD-10-CM

## 2021-04-19 ENCOUNTER — TELEPHONE (OUTPATIENT)
Dept: ONCOLOGY | Facility: CLINIC | Age: 36
End: 2021-04-19

## 2021-04-19 NOTE — TELEPHONE ENCOUNTER
Caller: MOE    Relationship to patient: PATIENT    Best call back number: [phone]    Type of visit: LAB AND FOLLOW UP     Requested date: 04/21    If rescheduling, when is the original appointment: 04/20

## 2021-04-20 ENCOUNTER — APPOINTMENT (OUTPATIENT)
Dept: LAB | Facility: HOSPITAL | Age: 36
End: 2021-04-20

## 2021-04-21 ENCOUNTER — APPOINTMENT (OUTPATIENT)
Dept: LAB | Facility: HOSPITAL | Age: 36
End: 2021-04-21

## 2021-04-28 ENCOUNTER — TREATMENT (OUTPATIENT)
Dept: PHYSICAL THERAPY | Facility: CLINIC | Age: 36
End: 2021-04-28

## 2021-04-28 DIAGNOSIS — R27.9 LACK OF COORDINATION: ICD-10-CM

## 2021-04-28 DIAGNOSIS — M46.1 SACROILIITIS (HCC): ICD-10-CM

## 2021-04-28 DIAGNOSIS — N94.2 VAGINISMUS: Primary | ICD-10-CM

## 2021-04-28 DIAGNOSIS — R10.2 PELVIC AND PERINEAL PAIN: ICD-10-CM

## 2021-04-28 PROCEDURE — 97112 NEUROMUSCULAR REEDUCATION: CPT | Performed by: PHYSICAL THERAPIST

## 2021-04-28 PROCEDURE — 97162 PT EVAL MOD COMPLEX 30 MIN: CPT | Performed by: PHYSICAL THERAPIST

## 2021-04-28 NOTE — PROGRESS NOTES
PHYSICAL THERAPY INITIAL EVALUATION  Patient: Peggy Suero   : 1985  Diagnosis/ICD-10 Code:  Vaginismus [N94.2]  Referring practitioner: Isabel Kline*  Date of Initial Visit: Type: THERAPY  Noted: 2021  Today's Date: 2021  Patient seen for 1 sessions           Subjective Questionnaire:  Female NIH-CPSI      Subjective Evaluation    History of Present Illness  Mechanism of injury: Patient is a 36 year-old reporting a two year history of pelvic/perineal pain and left sided low back pain (indicates L PSIS) which is 7/10, aggravated by tampon use (unable to use due to pain), well woman exam, and sexual intercourse. Occassional double voiding which occurs if bladder is too full, occurring once per week. To have pelvic ultrasound next week to r/o contributors to anemia (see below). Patient's goal for therapy is to not have pain.       Medical history reviewed with patient and includes childbirth and delivery  and , received iron infusion therapy 2021 due to anemia (also saw hematologist, rheumatologist, endocrinologist, pulmonologist) see additional medical history in medical record. Medication list in chart. Internal pelvic exam consent obtained and declined need for second person in room or C/P to internal pelvic exam.   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   Tenderness of the iliopsoas.     Right   Hypertonic in the iliopsoas. Tenderness of the iliopsoas.     Active Range of Motion     Lumbar   Normal active range of motion    Additional Active Range of Motion  Details  L PSIS pain at end range for lumbar extension     Strength/Myotome Testing     Left Hip   Planes of Motion   Flexion: 5  Extension: 4  Abduction: 4  Adduction: 4+    Right Hip   Planes of Motion   Flexion: 5  Extension: 4  Abduction: 4  Adduction: 4+    Muscle Activation   Patient able to activate left transverse abdominals and right transverse abdominals.     Additional Muscle Activation Details  Minimal diaphragmatic/abdominal excursion with relaxed breathing     Tests     Left Hip   Positive FADIR.      General Comments     Hip Comments   With hips at 90/90: R hip ER and HS ROM < L     Lumbar Comments  Visual inspection: perineal lift present with contraction, observable descent  upon relaxation  Left bulbocavernosus tender, hypertonic  Left superficial transverse perineal tender, hypertonic  Left Iliococcygeus tender hypertonic  Bilateral, L > R Pubococcygeus tender hypertonic  Left coccygeus tender, hypertonic  Left deep transverse perineal tender, hypertonic  R obturator internus tender, hypertonic  Able to contract pelvic floor on command, delayed relaxation after contraction           Assessment & Plan     Assessment  Impairments: abnormal coordination, abnormal muscle tone, activity intolerance and lacks appropriate home exercise program  Assessment details: Assessment: Patient presents for PT evaluation and treatment of pelvic/perineal and pelvic girdle pain (L) with today's assessment revealing a non relaxing hypertonic pelvic floor with associated pelvic positional faults, flexibility asymmetries, and motor control deficits; she needs to attend skilled physical therapy to address these deficits and return her to prior level of function.Goals and plan were discussed with the patient with the patient expressing understanding and agreement with the proposed plan of care.        Patient was instructed in clinical findings of today's encounter, goals and plan of care, short term HEP (DB and alt amanuel  hip ext), and potential and expected response to Rx,. Patient expressed understanding of instruction given.        Goals  Plan Goals: Short Term Goals (to be achieved in 30 days)  1) Outcome measure (Female NIH-CPSI score) score  < 20/44 to represent improved tolerance for self care  2) Independent in short term home exercise program and activity modification to improve symptoms    Long Term Goals (to be achieved within 90 days)   1) Outcome measure Female NIH-CPSI score) score  < 15/44 to represent improved tolerance for self care   2) > good lumbopelvic muscle coordination to represent improved self care  3) Patient to report >75% overall improvement in symptoms  4) Minimal to no soft tissue involvement (to improve tolerance for tampon use, gyn exam, intercourse)   5) Patient independent in long term home exercise program and ADL modifications     Plan  Therapy options: will be seen for skilled physical therapy services  Planned modality interventions: cryotherapy, thermotherapy (hydrocollator packs), dry needling, electrical stimulation/[place] stimulation and ultrasound  Planned therapy interventions: abdominal trunk stabilization, manual therapy, motor coordination training, neuromuscular re-education, ADL retraining, soft tissue mobilization, spinal/joint mobilization, flexibility, strengthening, stretching, home exercise program, therapeutic activities and joint mobilization  Frequency: 1-2X / week.  Treatment plan discussed with: patient        Visit Diagnoses:    ICD-10-CM ICD-9-CM   1. Vaginismus  N94.2 625.1   2. Pelvic and perineal pain  R10.2 625.9   3. Sacroiliitis (CMS/[facility])  M46.1 720.2   4. Lack of coordination  R27.9 781.3       Timed:  Manual Therapy:         mins  57277;  Therapeutic Exercise:         mins  58804;     Neuromuscular Odilon:  11      mins  75850;    Therapeutic Activity:          mins  68264;     Gait Training:           mins  02790;     Ultrasound:          mins  94464;    Electrical  Stimulation:         mins  06170 ( );    Untimed:  Electrical Stimulation:         mins  38402 ( );  Mechanical Traction:         mins  55182;   Dry Needling          mins self-pay  Traction          mins 71545  Low Eval          Mins  46643  Mod Eval      40    Mins  42409  High Eval                            Mins  55831  Re-Eval                               mins  73933    Timed Treatment:   11   mins   Total Treatment:     51   mins    PT SIGNATURE: MANOLO Trujillo, PT, DPT, WCS   DATE TREATMENT INITIATED: 4/28/2021    Initial Certification  Certification Period: 7/27/2021  I certify that the therapy services are furnished while this patient is under my care.  The services outlined above are required by this patient, and will be reviewed every 30 days.       PHYSICIAN: Isabel Kline MD    DATE:     Please sign and return via fax to [phone] .  Thank you for this referral and the opportunity to work with this patient.   Thank you, [facility].

## 2021-05-05 ENCOUNTER — OFFICE VISIT (OUTPATIENT)
Dept: ONCOLOGY | Facility: CLINIC | Age: 36
End: 2021-05-05

## 2021-05-05 ENCOUNTER — LAB (OUTPATIENT)
Dept: LAB | Facility: HOSPITAL | Age: 36
End: 2021-05-05

## 2021-05-05 VITALS
SYSTOLIC BLOOD PRESSURE: 122 MMHG | RESPIRATION RATE: 16 BRPM | BODY MASS INDEX: 18.31 KG/M2 | HEIGHT: 65 IN | OXYGEN SATURATION: 100 % | DIASTOLIC BLOOD PRESSURE: 82 MMHG | WEIGHT: 109.9 LBS | HEART RATE: 88 BPM | TEMPERATURE: 98.6 F

## 2021-05-05 DIAGNOSIS — D50.9 IRON DEFICIENCY ANEMIA, UNSPECIFIED IRON DEFICIENCY ANEMIA TYPE: ICD-10-CM

## 2021-05-05 DIAGNOSIS — R79.0 LOW FERRITIN: ICD-10-CM

## 2021-05-05 DIAGNOSIS — D50.9 IRON DEFICIENCY ANEMIA, UNSPECIFIED IRON DEFICIENCY ANEMIA TYPE: Primary | ICD-10-CM

## 2021-05-05 LAB
BASOPHILS # BLD AUTO: 0.06 10*3/MM3 (ref 0–0.2)
BASOPHILS NFR BLD AUTO: 1 % (ref 0–1.5)
DEPRECATED RDW RBC AUTO: 43.5 FL (ref 37–54)
EOSINOPHIL # BLD AUTO: 0.23 10*3/MM3 (ref 0–0.4)
EOSINOPHIL NFR BLD AUTO: 3.9 % (ref 0.3–6.2)
ERYTHROCYTE [DISTWIDTH] IN BLOOD BY AUTOMATED COUNT: 12.6 % (ref 12.3–15.4)
FERRITIN SERPL-MCNC: 265.6 NG/ML (ref 11–207)
HCT VFR BLD AUTO: 40.1 % (ref 34–46.6)
HGB BLD-MCNC: 13.8 G/DL (ref 12–15.9)
IMM GRANULOCYTES # BLD AUTO: 0.04 10*3/MM3 (ref 0–0.05)
IMM GRANULOCYTES NFR BLD AUTO: 0.7 % (ref 0–0.5)
IRON 24H UR-MRATE: 105 MCG/DL (ref 37–145)
IRON SATN MFR SERPL: 38 % (ref 14–48)
LYMPHOCYTES # BLD AUTO: 2 10*3/MM3 (ref 0.7–3.1)
LYMPHOCYTES NFR BLD AUTO: 33.8 % (ref 19.6–45.3)
MCH RBC QN AUTO: 32.5 PG (ref 26.6–33)
MCHC RBC AUTO-ENTMCNC: 34.4 G/DL (ref 31.5–35.7)
MCV RBC AUTO: 94.6 FL (ref 79–97)
MONOCYTES # BLD AUTO: 0.4 10*3/MM3 (ref 0.1–0.9)
MONOCYTES NFR BLD AUTO: 6.8 % (ref 5–12)
NEUTROPHILS NFR BLD AUTO: 3.18 10*3/MM3 (ref 1.7–7)
NEUTROPHILS NFR BLD AUTO: 53.8 % (ref 42.7–76)
NRBC BLD AUTO-RTO: 0 /100 WBC (ref 0–0.2)
PLATELET # BLD AUTO: 246 10*3/MM3 (ref 140–450)
PMV BLD AUTO: 9.7 FL (ref 6–12)
RBC # BLD AUTO: 4.24 10*6/MM3 (ref 3.77–5.28)
TIBC SERPL-MCNC: 274 MCG/DL (ref 249–505)
TRANSFERRIN SERPL-MCNC: 196 MG/DL (ref 200–360)
WBC # BLD AUTO: 5.91 10*3/MM3 (ref 3.4–10.8)

## 2021-05-05 PROCEDURE — 82728 ASSAY OF FERRITIN: CPT

## 2021-05-05 PROCEDURE — 83540 ASSAY OF IRON: CPT

## 2021-05-05 PROCEDURE — 85025 COMPLETE CBC W/AUTO DIFF WBC: CPT

## 2021-05-05 PROCEDURE — 84466 ASSAY OF TRANSFERRIN: CPT

## 2021-05-05 PROCEDURE — 36415 COLL VENOUS BLD VENIPUNCTURE: CPT

## 2021-05-05 PROCEDURE — 99213 OFFICE O/P EST LOW 20 MIN: CPT | Performed by: NURSE PRACTITIONER

## 2021-05-05 NOTE — PROGRESS NOTES
Subjective     REASON FOR FOLLOW-UP:  Iron deficiency anemia                             REQUESTING PRACTITIONER:  Hank    HISTORY OF PRESENT ILLNESS:  The patient is a 36 y.o. year old female who is here for an opinion about the above issue.    History of Present Illness   Ms. Suero is seen back today in 3-month follow-up.  She was seen in initial consultation in January of this year referred for evaluation and treatment of iron deficiency anemia.  Hemoglobin at the time was 11.4, MCV 85.  BEN at the time felt to be related to possible heavy menses and also possible malabsorption with the patient having unusual weight loss and intermittent abdominal discomfort.  Patient reported intolerance to multivitamin with nausea and constipation.  Repeat iron studies showed ferritin of 9, iron saturation of 8%.  We therefore recommended IV Injectafer x2 doses.    Patient has undergone evaluation per GI, Dr. Acevedo with EGD and colonoscopy 1/20/2021.  EGD showed normal esophagus and gastritis. Colon and examined portion of ileum were normal.  Per review of EMR Dr. Acevedo wishes for the patient to undergo CT scans but she is having issues with her insurance covering this and is still trying to work this out.  She is also being evaluated by her GYN and per review of these records, menstrual cycles are corded is heavy though patient today in the office does not feel they are overly heavy.  Cycle lasts about 5 days, occurring regularly every 26 to 28 days.  She is also having some difficulty with painful intercourse and night sweats.  Patient reports that this is being further worked up with vaginal ultrasound scheduled next week.    As she is reviewed back today I discussed with her that her hemoglobin has normalized to 13.8 and MCV improved to 95.  Iron studies are pending but presumed to be replete based on significant improvement in her hemoglobin.  Patient states she did notice some improvement in her energy  initially after IV Injectafer but is once again feeling fatigued.  We discussed that if she does have iron deficiency secondary to heavy menses she may indeed require IV iron again in the future.    She denies other concerns at this time.    Past Medical History:   Diagnosis Date   • Abdominal pain    • Anemia    • Anxiety    • Depression    • GERD (gastroesophageal reflux disease)    • Hernia, hiatal    • Migraine    • Murmur, cardiac    • Peptic ulceration    • Ulcer of abdomen wall (CMS/HCC) 2007        Past Surgical History:   Procedure Laterality Date   • COLONOSCOPY     • COLONOSCOPY W/ POLYPECTOMY N/A 1/20/2021    Procedure: COLONOSCOPY TO CECUM AND TI;  Surgeon: Tim Hunter MD;  Location: [facility] ENDOSCOPY;  Service: Gastroenterology;  Laterality: N/A;  PRE- WEIGHT LOSS  POST- NORMAL   • DENTAL PROCEDURE     • ENDOSCOPY N/A 6/22/2016    Procedure: ESOPHAGOGASTRODUODENOSCOPY WITH BIOPSIES;  Surgeon: Tim Hunter MD;  Location: [facility] ENDOSCOPY;  Service:    • ENDOSCOPY N/A 1/20/2021    Procedure: ESOPHAGOGASTRODUODENOSCOPY WITH BIOPSIES;  Surgeon: Tim Hunter MD;  Location: [facility] ENDOSCOPY;  Service: Gastroenterology;  Laterality: N/A;  PRE- WEIGHT LOSS  POST- EROSIVE GASTRITIS     • EYE SURGERY Left 1986    STAUBISMIS   • LAPAROSCOPIC TUBAL LIGATION  2012   • TUBAL ABDOMINAL LIGATION  2011   • WISDOM TOOTH EXTRACTION          Current Outpatient Medications on File Prior to Visit   Medication Sig Dispense Refill   • CBD (cannabidiol) oral oil Take  by mouth Every Night.     • rizatriptan MLT (Maxalt-MLT) 10 MG disintegrating tablet Place 1 tablet on the tongue 1 (One) Time As Needed for Migraine for up to 1 dose. May repeat in 2 hours if needed 9 tablet 0     No current facility-administered medications on file prior to visit.        ALLERGIES:  No Known Allergies     Social History     Socioeconomic History   • Marital status:      Spouse name: Dale   • Number of  children: Not on file   • Years of education: Not on file   • Highest education level: Not on file   Tobacco Use   • Smoking status: Never Smoker   • Smokeless tobacco: Never Used   Substance and Sexual Activity   • Alcohol use: Yes     Comment: social   • Drug use: No   • Sexual activity: Yes     Partners: Male     Birth control/protection: Surgical     Comment: BTO        Family History   Problem Relation Age of Onset   • Diabetes Mother    • Migraines Mother    • Dementia Mother    • Hypertension Mother    • Thyroid disease Mother    • Hyperlipidemia Mother    • Asthma Father    • Hyperlipidemia Father    • Hypertension Father    • Cervical cancer Maternal Grandmother    • Dementia Maternal Grandmother    • Ovarian cancer Maternal Grandmother    • Uterine cancer Paternal Grandmother    • Dementia Paternal Grandmother    • Stroke Paternal Grandmother    • Hypertension Paternal Grandmother    • Heart disease Paternal Grandmother    • Diabetes Paternal Grandmother    • Thyroid disease Paternal Grandmother    • Kidney disease Paternal Grandmother    • Heart disease Maternal Grandfather    • Heart disease Paternal Grandfather    • Esophageal cancer Paternal Grandfather    • Colon polyps Maternal Uncle    • Colon cancer Other    • Malig Hyperthermia Neg Hx    • Breast cancer Neg Hx    • Pulmonary embolism Neg Hx    • Deep vein thrombosis Neg Hx         Review of Systems   Constitutional: Positive for fatigue and unexpected weight change. Negative for appetite change, diaphoresis and fever.   HENT: Negative for congestion.    Eyes: Negative.    Respiratory: Negative.    Cardiovascular: Negative.    Gastrointestinal: Positive for abdominal pain, constipation and nausea.   Genitourinary: Positive for menstrual problem.   Musculoskeletal: Negative.    Skin: Negative.    Allergic/Immunologic: Negative.    Hematological: Negative.    Psychiatric/Behavioral: The patient is nervous/anxious.         Objective     Vitals:     "05/05/21 1403   BP: 122/82   Pulse: 88   Resp: 16   Temp: 98.6 °F (37 °C)   TempSrc: Skin   SpO2: 100%   Weight: 49.9 kg (109 lb 14.4 oz)   Height: 165.1 cm (65\")   PainSc: 0-No pain     Current Status 1/25/2021   ECOG score 0       Physical Exam    In no acute distress.  Awake, alert, appropriately verbal.  Breathing unlabored, no apparent use of accessory muscles of respiration.  Cranial nerves II-XII grossly intact, no focal deficits.  Appropriate affect.  Asking appropriate questions.    RECENT LABS:  Results from last 7 days   Lab Units 05/05/21  1353   WBC 10*3/mm3 5.91   NEUTROS ABS 10*3/mm3 3.18   HEMOGLOBIN g/dL 13.8   HEMATOCRIT % 40.1   PLATELETS 10*3/mm3 246                 Assessment/Plan   1.  Iron deficiency:   · 12/2020: Iron studies per PCP showing a ferritin 6.4 and iron saturation 5% consistent with significant iron deficiency.  Etiology of iron deficiency most likely related to her menstrual cycles plus possible malabsorption given her GI symptoms and weight loss.  She reports negative testing for celiac disease.  Recent EGD and colonoscopy were negative.  She does not tolerate oral iron secondary to nausea and constipation.  · Repeat anemia labs 1/25/2021: Hemoglobin 11.4, folate 20, B12 692, iron saturation 8%, TIBC 440, ferritin 9.  Recommended IV Injectafer x2 doses, given 1/27 and 2/3/2021.    2.  Weight loss,? malabsorption contributing to BEN.  Patient continues to be evaluated by GI, Dr. Acevedo.  Recent scopes in January 2021 on the whole unremarkable except for some gastritis.  Dr. Acevedo desires to the patient undergo CT of the abdomen/pelvis. Patient is currently fighting with insurance to get this approved.    3.  Painful intercourse, intermittent low abdominal pain,?  Heavy menses.  Patient undergoing evaluation per Dr. Kline, GYN.  Patient reports she is scheduled to undergo vaginal ultrasound next week.    PLAN:  1.  Reviewed CBC with patient today noting normalization of " hemoglobin.  MCV improved to 95.  Iron studies currently pending but expect repletion of iron stores based on numbers already available today.  2.  Patient will continue work-up with GI and GYN per above.  3.  Recommended return to clinic in 3 months with MD follow-up, CBC, ferritin, iron profile.  Encourage patient to call if she is noted by other providers to have dropping hemoglobin or if she has worsening fatigue again warranting sooner check of her hemoglobin.    I spent 20 minutes caring for Peggy Suero on this date of service. This time includes time spent by me in the following activities:preparing for the visit, reviewing tests, obtaining and/or reviewing a separately obtained history, counseling and educating the patient, documenting information in the medical record, independently interpreting results and communicating that information with the patient and care coordination.

## 2021-05-07 ENCOUNTER — TELEMEDICINE (OUTPATIENT)
Dept: FAMILY MEDICINE CLINIC | Facility: CLINIC | Age: 36
End: 2021-05-07

## 2021-05-07 VITALS — WEIGHT: 109 LBS | BODY MASS INDEX: 18.16 KG/M2 | HEIGHT: 65 IN

## 2021-05-07 DIAGNOSIS — G43.009 MIGRAINE WITHOUT AURA AND WITHOUT STATUS MIGRAINOSUS, NOT INTRACTABLE: Primary | ICD-10-CM

## 2021-05-07 PROCEDURE — 99213 OFFICE O/P EST LOW 20 MIN: CPT | Performed by: NURSE PRACTITIONER

## 2021-05-07 RX ORDER — SUMATRIPTAN AND NAPROXEN SODIUM 85; 500 MG/1; MG/1
TABLET, FILM COATED ORAL
Qty: 9 TABLET | Refills: 3 | Status: SHIPPED | OUTPATIENT
Start: 2021-05-07 | End: 2022-09-15

## 2021-05-07 NOTE — PROGRESS NOTES
"Chief Complaint  migraines (Patient has had a migraine for the past 3 days )    Subjective          Peggy Suero presents to [facility]  History of Present Illness  This was an audio and video enabled telemedicine encounter.    Migraine for 3 days without stopping.  Same headache as she always has.  She has been using her Maxalt but it has not worked.  She is requesting Treximet which she is used in the past and works very well for her migraines.  She is also requesting a work note to excuse her from work Wednesday through today and going back to work on Monday    Objective   Vital Signs:   Ht 165.1 cm (65\")   Wt 49.4 kg (109 lb)   BMI 18.14 kg/m²     Physical Exam   Result Review :                 Assessment and Plan    Diagnoses and all orders for this visit:    1. Migraine without aura and without status migrainosus, not intractable (Primary)  -     SUMAtriptan-naproxen (Treximet)  MG per tablet; Take one tablet at onset of headache. May repeat dose one time in 2 hours if headache not relieved.  Dispense: 9 tablet; Refill: 3        Follow Up   No follow-ups on file.  Patient was given instructions and counseling regarding her condition or for health maintenance advice. Please see specific information pulled into the AVS if appropriate.     Visit lasted 10 mins  rto prn    "

## 2021-05-12 ENCOUNTER — OFFICE VISIT (OUTPATIENT)
Dept: FAMILY MEDICINE CLINIC | Facility: CLINIC | Age: 36
End: 2021-05-12

## 2021-05-12 VITALS
HEIGHT: 65 IN | OXYGEN SATURATION: 99 % | DIASTOLIC BLOOD PRESSURE: 66 MMHG | HEART RATE: 76 BPM | TEMPERATURE: 98.7 F | SYSTOLIC BLOOD PRESSURE: 112 MMHG | BODY MASS INDEX: 18.49 KG/M2 | WEIGHT: 111 LBS

## 2021-05-12 DIAGNOSIS — D50.9 IRON DEFICIENCY ANEMIA, UNSPECIFIED IRON DEFICIENCY ANEMIA TYPE: Primary | ICD-10-CM

## 2021-05-12 DIAGNOSIS — F32.A DEPRESSION, UNSPECIFIED DEPRESSION TYPE: ICD-10-CM

## 2021-05-12 DIAGNOSIS — F41.9 ANXIETY: ICD-10-CM

## 2021-05-12 PROCEDURE — 99214 OFFICE O/P EST MOD 30 MIN: CPT | Performed by: NURSE PRACTITIONER

## 2021-05-12 RX ORDER — BUPROPION HYDROCHLORIDE 150 MG/1
150 TABLET ORAL DAILY
Qty: 30 TABLET | Refills: 3 | Status: SHIPPED | OUTPATIENT
Start: 2021-05-12 | End: 2021-10-08

## 2021-05-12 NOTE — PROGRESS NOTES
"Chief Complaint  Anxiety (Patient thinks she may be having anxiety issues it started last July when she started to having alot of medical problems she is said it is progressively getting worse. The anxiety is causing her to feel very weepy, constantly feeling nauseaous, and sob ( wore mask and goggles) )    Subjective          Peggy Suero presents to [facility]  History of Present Illness  1.Iron deficiency anemia-patient is currently controlled with normal iron and CBC levels and seeing hematology for this.  He has had a significant work-up from GI and also OB/GYN related to heavy menses.    2.  Anxiety and depression- Pt has been experiencing a lot of medical problems that started last July and most all things have resulted as normal but it has left her very anxious and having some depression.  She is tearful most days and feeling like she needs a medication to help.  Has tried Celexa in the past but she didn't like how it made her feel \"blank\".  Feels like her anxiety is worse than her depression.  Taking out aggression on her family.        Objective   Vital Signs:   /66   Pulse 76   Temp 98.7 °F (37.1 °C)   Ht 165.1 cm (65\")   Wt 50.3 kg (111 lb)   SpO2 99%   BMI 18.47 kg/m²     Physical Exam  Vitals reviewed.   Constitutional:       General: She is not in acute distress.     Appearance: She is well-developed.   HENT:      Head: Normocephalic.   Cardiovascular:      Rate and Rhythm: Normal rate and regular rhythm.      Heart sounds: Normal heart sounds.   Pulmonary:      Effort: Pulmonary effort is normal.      Breath sounds: Normal breath sounds.   Neurological:      Mental Status: She is alert and oriented to person, place, and time.      Gait: Gait normal.   Psychiatric:         Behavior: Behavior normal.         Thought Content: Thought content normal.         Judgment: Judgment normal.        Result Review :   The following data was reviewed by: Sohail Mckinney, " APRN on 05/12/2021:  CMP    CMP 7/20/20   Glucose 79   BUN 9   Creatinine 0.92   eGFR Non  Am 69   eGFR African Am 84   Sodium 140   Potassium 3.9   Chloride 102   Calcium 9.1   Total Protein 6.6   Albumin 4.40   Globulin 2.2   Total Bilirubin 0.7   Alkaline Phosphatase 48   AST (SGOT) 14   ALT (SGPT) 10           CBC w/diff    CBC w/Diff 12/16/20 1/25/21 5/5/21   WBC 6.29 6.98 5.91   RBC 4.34 4.06 4.24   Hemoglobin 11.9 (A) 11.4 (A) 13.8   Hematocrit 37.1 34.6 40.1   MCV 85.5 85.2 94.6   MCH 27.4 28.1 32.5   MCHC 32.1 32.9 34.4   RDW 13.3 14.0 12.6   Platelets 301 225 246   Neutrophil Rel % 55.2 47.5 53.8   Immature Granulocyte Rel %  0.6 (A) 0.7 (A)   Lymphocyte Rel % 34.0 37.4 33.8   Monocyte Rel % 6.8 8.6 6.8   Eosinophil Rel % 2.7 4.6 3.9   Basophil Rel % 1.0 1.3 1.0   (A) Abnormal value            Lipid Panel    Lipid Panel 7/20/20   Total Cholesterol 183   Triglycerides 67   HDL Cholesterol 82 (A)   VLDL Cholesterol 13.4   LDL Cholesterol  88   LDL/HDL Ratio 1.07   (A) Abnormal value            TSH    TSH 7/20/20 9/1/20   TSH 2.870 1.260           Data reviewed: Cardiology studies echo 7/2020 and Consultant notes hem/on 5/5/21, 4/7/21 OBGYN        Ferritin and iron panels reveiwed, OBGYN labs reviewed.    Assessment and Plan    Diagnoses and all orders for this visit:    1. Iron deficiency anemia, unspecified iron deficiency anemia type (Primary)    2. Anxiety  -     buPROPion XL (Wellbutrin XL) 150 MG 24 hr tablet; Take 1 tablet by mouth Daily.  Dispense: 30 tablet; Refill: 3    3. Depression, unspecified depression type  -     buPROPion XL (Wellbutrin XL) 150 MG 24 hr tablet; Take 1 tablet by mouth Daily.  Dispense: 30 tablet; Refill: 3        Follow Up   No follow-ups on file.  Patient was given instructions and counseling regarding her condition or for health maintenance advice. Please see specific information pulled into the AVS if appropriate.     Start Wellbutrin XL 150mg   rto in 6  weeks    Mask and googles worn

## 2021-05-19 ENCOUNTER — PROCEDURE VISIT (OUTPATIENT)
Dept: OBSTETRICS AND GYNECOLOGY | Facility: CLINIC | Age: 36
End: 2021-05-19

## 2021-05-19 ENCOUNTER — TREATMENT (OUTPATIENT)
Dept: PHYSICAL THERAPY | Facility: CLINIC | Age: 36
End: 2021-05-19

## 2021-05-19 VITALS
WEIGHT: 107.6 LBS | SYSTOLIC BLOOD PRESSURE: 96 MMHG | HEIGHT: 65 IN | DIASTOLIC BLOOD PRESSURE: 62 MMHG | BODY MASS INDEX: 17.93 KG/M2

## 2021-05-19 DIAGNOSIS — R10.2 PELVIC AND PERINEAL PAIN: ICD-10-CM

## 2021-05-19 DIAGNOSIS — N92.0 MENORRHAGIA WITH REGULAR CYCLE: ICD-10-CM

## 2021-05-19 DIAGNOSIS — Z30.014 ENCOUNTER FOR INITIAL PRESCRIPTION OF INTRAUTERINE CONTRACEPTIVE DEVICE (IUD): Primary | ICD-10-CM

## 2021-05-19 DIAGNOSIS — M46.1 SACROILIITIS (HCC): ICD-10-CM

## 2021-05-19 DIAGNOSIS — Z71.83 ENCOUNTER FOR NONPROCREATIVE GENETIC COUNSELING: ICD-10-CM

## 2021-05-19 DIAGNOSIS — Z80.41 FAMILY HISTORY OF OVARIAN CANCER: ICD-10-CM

## 2021-05-19 DIAGNOSIS — R27.9 LACK OF COORDINATION: ICD-10-CM

## 2021-05-19 DIAGNOSIS — N94.2 VAGINISMUS: Primary | ICD-10-CM

## 2021-05-19 PROCEDURE — 97112 NEUROMUSCULAR REEDUCATION: CPT | Performed by: PHYSICAL THERAPIST

## 2021-05-19 PROCEDURE — 58100 BIOPSY OF UTERUS LINING: CPT | Performed by: OBSTETRICS & GYNECOLOGY

## 2021-05-19 PROCEDURE — 97110 THERAPEUTIC EXERCISES: CPT | Performed by: PHYSICAL THERAPIST

## 2021-05-19 PROCEDURE — 97140 MANUAL THERAPY 1/> REGIONS: CPT | Performed by: PHYSICAL THERAPIST

## 2021-05-19 PROCEDURE — 99214 OFFICE O/P EST MOD 30 MIN: CPT | Performed by: OBSTETRICS & GYNECOLOGY

## 2021-05-19 NOTE — PATIENT INSTRUCTIONS
Access Code: [email or web address]: [email or web address]: 05/19/2021Prepared by: Nazia Jordan EnsorExercises   Supine Figure 4 Piriformis Stretch - 1 x daily - 7 x weekly - 1 sets - 2 reps - 20 hold   Modified Doyle Stretch - 2 x daily - 7 x weekly - 10 reps - 2 sets - 20 hold   Supine Bridge with Resistance Band - 2 x daily - 7 x weekly - 2 sets - 10 reps - 2 hold

## 2021-05-21 LAB
DX ICD CODE: NORMAL
PATH REPORT.FINAL DX SPEC: NORMAL
PATH REPORT.GROSS SPEC: NORMAL
PATH REPORT.RELEVANT HX SPEC: NORMAL
PATH REPORT.SITE OF ORIGIN SPEC: NORMAL
PATHOLOGIST NAME: NORMAL
PAYMENT PROCEDURE: NORMAL

## 2021-05-26 ENCOUNTER — TELEPHONE (OUTPATIENT)
Dept: PHYSICAL THERAPY | Facility: CLINIC | Age: 36
End: 2021-05-26

## 2021-06-10 ENCOUNTER — HOSPITAL ENCOUNTER (EMERGENCY)
Facility: HOSPITAL | Age: 36
Discharge: HOME | End: 2021-06-10
Attending: EMERGENCY MEDICINE | Admitting: EMERGENCY MEDICINE

## 2021-06-10 VITALS
SYSTOLIC BLOOD PRESSURE: 122 MMHG | BODY MASS INDEX: 18.48 KG/M2 | OXYGEN SATURATION: 98 % | DIASTOLIC BLOOD PRESSURE: 85 MMHG | HEIGHT: 65 IN | RESPIRATION RATE: 16 BRPM | WEIGHT: 110.89 LBS | TEMPERATURE: 97.9 F | HEART RATE: 66 BPM

## 2021-06-10 DIAGNOSIS — R55 NEAR SYNCOPE: Primary | ICD-10-CM

## 2021-06-10 LAB
ALBUMIN SERPL-MCNC: 4.2 G/DL (ref 3.5–5.2)
ALBUMIN/GLOB SERPL: 1.8 G/DL
ALP SERPL-CCNC: 42 U/L (ref 39–117)
ALT SERPL W P-5'-P-CCNC: 8 U/L (ref 1–33)
ANION GAP SERPL CALCULATED.3IONS-SCNC: 8.5 MMOL/L (ref 5–15)
AST SERPL-CCNC: 9 U/L (ref 1–32)
BASOPHILS # BLD AUTO: 0.07 10*3/MM3 (ref 0–0.2)
BASOPHILS NFR BLD AUTO: 1.3 % (ref 0–1.5)
BILIRUB SERPL-MCNC: 0.3 MG/DL (ref 0–1.2)
BUN SERPL-MCNC: 6 MG/DL (ref 6–20)
BUN/CREAT SERPL: 7.3 (ref 7–25)
CALCIUM SPEC-SCNC: 8.7 MG/DL (ref 8.6–10.5)
CHLORIDE SERPL-SCNC: 106 MMOL/L (ref 98–107)
CO2 SERPL-SCNC: 25.5 MMOL/L (ref 22–29)
CREAT SERPL-MCNC: 0.82 MG/DL (ref 0.57–1)
DEPRECATED RDW RBC AUTO: 44.6 FL (ref 37–54)
EOSINOPHIL # BLD AUTO: 0.18 10*3/MM3 (ref 0–0.4)
EOSINOPHIL NFR BLD AUTO: 3.3 % (ref 0.3–6.2)
ERYTHROCYTE [DISTWIDTH] IN BLOOD BY AUTOMATED COUNT: 12.5 % (ref 12.3–15.4)
GFR SERPL CREATININE-BSD FRML MDRD: 78 ML/MIN/1.73
GLOBULIN UR ELPH-MCNC: 2.3 GM/DL
GLUCOSE SERPL-MCNC: 91 MG/DL (ref 65–99)
HCT VFR BLD AUTO: 39.6 % (ref 34–46.6)
HGB BLD-MCNC: 13.2 G/DL (ref 12–15.9)
IMM GRANULOCYTES # BLD AUTO: 0.04 10*3/MM3 (ref 0–0.05)
IMM GRANULOCYTES NFR BLD AUTO: 0.7 % (ref 0–0.5)
LYMPHOCYTES # BLD AUTO: 1.89 10*3/MM3 (ref 0.7–3.1)
LYMPHOCYTES NFR BLD AUTO: 34.4 % (ref 19.6–45.3)
MCH RBC QN AUTO: 32.2 PG (ref 26.6–33)
MCHC RBC AUTO-ENTMCNC: 33.3 G/DL (ref 31.5–35.7)
MCV RBC AUTO: 96.6 FL (ref 79–97)
MONOCYTES # BLD AUTO: 0.42 10*3/MM3 (ref 0.1–0.9)
MONOCYTES NFR BLD AUTO: 7.6 % (ref 5–12)
NEUTROPHILS NFR BLD AUTO: 2.9 10*3/MM3 (ref 1.7–7)
NEUTROPHILS NFR BLD AUTO: 52.7 % (ref 42.7–76)
NRBC BLD AUTO-RTO: 0 /100 WBC (ref 0–0.2)
PLATELET # BLD AUTO: 218 10*3/MM3 (ref 140–450)
PMV BLD AUTO: 9.8 FL (ref 6–12)
POTASSIUM SERPL-SCNC: 3.8 MMOL/L (ref 3.5–5.2)
PROT SERPL-MCNC: 6.5 G/DL (ref 6–8.5)
QT INTERVAL: 451 MS
RBC # BLD AUTO: 4.1 10*6/MM3 (ref 3.77–5.28)
SODIUM SERPL-SCNC: 140 MMOL/L (ref 136–145)
WBC # BLD AUTO: 5.5 10*3/MM3 (ref 3.4–10.8)

## 2021-06-10 PROCEDURE — 93010 ELECTROCARDIOGRAM REPORT: CPT | Performed by: INTERNAL MEDICINE

## 2021-06-10 PROCEDURE — 99283 EMERGENCY DEPT VISIT LOW MDM: CPT

## 2021-06-10 PROCEDURE — 85025 COMPLETE CBC W/AUTO DIFF WBC: CPT | Performed by: EMERGENCY MEDICINE

## 2021-06-10 PROCEDURE — 80053 COMPREHEN METABOLIC PANEL: CPT | Performed by: EMERGENCY MEDICINE

## 2021-06-10 PROCEDURE — 93005 ELECTROCARDIOGRAM TRACING: CPT | Performed by: EMERGENCY MEDICINE

## 2021-06-10 RX ADMIN — SODIUM CHLORIDE 1000 ML: 9 INJECTION, SOLUTION INTRAVENOUS at 09:38

## 2021-06-10 NOTE — ED PROVIDER NOTES
EMERGENCY DEPARTMENT ENCOUNTER    Room Number:  12/12  PCP: Sohail Mckinney APRN  Historian: Patient  History Limited By: Nothing      HPI  Chief Complaint: Lightheadedness and near syncope  Context: Peggy Suero is a 36 y.o. female who presents to the ED c/o lightheadedness and near syncope.  Patient states she has had problems with dizziness and headaches for the last year.  Has seen multiple specialists including rheumatologist pulmonary and hematology.  Patient has had iron deficiency anemia.  Patient states today she was driving and felt lightheaded.  States she started seeing spots.  Had no chest pain or shortness of breath.  Had no palpitations.  Had no vomiting or diarrhea.  Patient has had no fevers.  States it is not vertigo.  States symptoms definitely worse with standing.      Location: Lightheaded  Radiation: None  Character: Orthostatic today  Duration: 1 day  Severity: Moderate  Progression: Improved   Aggravating Factors: Standing  Alleviating Factors: Remaining still        MEDICAL RECORD REVIEW    Patient has been seen for migraines and iron deficiency anemia in the past          PAST MEDICAL HISTORY  Active Ambulatory Problems     Diagnosis Date Noted   • Screening for ischemic heart disease 08/10/2016   • Fatigue 08/10/2016   • Muscle ache 08/10/2016   • Anxiety 08/10/2016   • Diarrhea 09/22/2016   • Insomnia 02/08/2017   • Migraine headache 02/27/2017   • Memory loss 08/03/2017   • Acute bilateral low back pain without sciatica 08/11/2017   • Chest pain 12/15/2017   • Gastroesophageal reflux disease without esophagitis 12/15/2017   • Weight loss 07/17/2020   • Hair loss 07/17/2020   • Low ferritin 12/16/2020   • Iron deficiency anemia 01/25/2021   • Depression 05/12/2021     Resolved Ambulatory Problems     Diagnosis Date Noted   • Pap smear, as part of routine gynecological examination 09/22/2016   • SOB (shortness of breath) 08/10/2020     Past Medical History:   Diagnosis Date   •  Abdominal pain    • Anemia    • GERD (gastroesophageal reflux disease)    • Hernia, hiatal    • Migraine    • Murmur, cardiac    • Peptic ulceration    • Ulcer of abdomen wall (CMS/HCC) 2007         PAST SURGICAL HISTORY  Past Surgical History:   Procedure Laterality Date   • COLONOSCOPY     • COLONOSCOPY W/ POLYPECTOMY N/A 1/20/2021    Procedure: COLONOSCOPY TO CECUM AND TI;  Surgeon: Tim Hunter MD;  Location: [facility] ENDOSCOPY;  Service: Gastroenterology;  Laterality: N/A;  PRE- WEIGHT LOSS  POST- NORMAL   • DENTAL PROCEDURE     • ENDOSCOPY N/A 6/22/2016    Procedure: ESOPHAGOGASTRODUODENOSCOPY WITH BIOPSIES;  Surgeon: Tim Hunter MD;  Location: [facility] ENDOSCOPY;  Service:    • ENDOSCOPY N/A 1/20/2021    Procedure: ESOPHAGOGASTRODUODENOSCOPY WITH BIOPSIES;  Surgeon: Tim Hunter MD;  Location: [facility] ENDOSCOPY;  Service: Gastroenterology;  Laterality: N/A;  PRE- WEIGHT LOSS  POST- EROSIVE GASTRITIS     • EYE SURGERY Left 1986    STAUBISMIS   • LAPAROSCOPIC TUBAL LIGATION  2012   • TUBAL ABDOMINAL LIGATION  2011   • WISDOM TOOTH EXTRACTION           FAMILY HISTORY  Family History   Problem Relation Age of Onset   • Diabetes Mother    • Migraines Mother    • Dementia Mother    • Hypertension Mother    • Thyroid disease Mother    • Hyperlipidemia Mother    • Asthma Father    • Hyperlipidemia Father    • Hypertension Father    • Cervical cancer Maternal Grandmother    • Dementia Maternal Grandmother    • Ovarian cancer Maternal Grandmother    • Uterine cancer Paternal Grandmother    • Dementia Paternal Grandmother    • Stroke Paternal Grandmother    • Hypertension Paternal Grandmother    • Heart disease Paternal Grandmother    • Diabetes Paternal Grandmother    • Thyroid disease Paternal Grandmother    • Kidney disease Paternal Grandmother    • Heart disease Maternal Grandfather    • Heart disease Paternal Grandfather    • Esophageal cancer Paternal Grandfather    • Colon polyps Maternal  Uncle    • Colon cancer Other    • Malig Hyperthermia Neg Hx    • Breast cancer Neg Hx    • Pulmonary embolism Neg Hx    • Deep vein thrombosis Neg Hx          SOCIAL HISTORY  Social History     Socioeconomic History   • Marital status:      Spouse name: Dale   • Number of children: Not on file   • Years of education: Not on file   • Highest education level: Not on file   Tobacco Use   • Smoking status: Never Smoker   • Smokeless tobacco: Never Used   Substance and Sexual Activity   • Alcohol use: Yes     Comment: social   • Drug use: No   • Sexual activity: Yes     Partners: Male     Birth control/protection: Surgical     Comment: BTO         ALLERGIES  Patient has no known allergies.        REVIEW OF SYSTEMS  Review of Systems   Constitutional: Negative for fever.   HENT: Negative for sore throat.    Eyes: Negative.    Respiratory: Negative for cough and shortness of breath.    Cardiovascular: Negative for chest pain.   Gastrointestinal: Negative for abdominal pain, diarrhea and vomiting.   Genitourinary: Negative for dysuria.   Musculoskeletal: Negative for neck pain.   Skin: Negative for rash.   Allergic/Immunologic: Negative.    Neurological: Positive for light-headedness and headaches. Negative for weakness and numbness.   Hematological: Negative.    Psychiatric/Behavioral: Negative.    All other systems reviewed and are negative.           PHYSICAL EXAM  ED Triage Vitals   Temp Heart Rate Resp BP SpO2   06/10/21 0908 06/10/21 0908 06/10/21 0908 06/10/21 0925 06/10/21 0908   97.9 °F (36.6 °C) 81 16 118/81 98 %      Temp src Heart Rate Source Patient Position BP Location FiO2 (%)   06/10/21 0908 -- 06/10/21 0925 -- --   Tympanic  Lying         Physical Exam  Vitals and nursing note reviewed.   Constitutional:       General: She is not in acute distress.  HENT:      Head: Normocephalic and atraumatic.   Eyes:      Pupils: Pupils are equal, round, and reactive to light.   Cardiovascular:      Rate and  Rhythm: Normal rate and regular rhythm.      Heart sounds: Normal heart sounds.   Pulmonary:      Effort: Pulmonary effort is normal. No respiratory distress.      Breath sounds: Normal breath sounds.   Abdominal:      Palpations: Abdomen is soft.      Tenderness: There is no abdominal tenderness. There is no guarding or rebound.   Musculoskeletal:         General: Normal range of motion.      Cervical back: Normal range of motion and neck supple.   Skin:     General: Skin is warm and dry.      Findings: No rash.   Neurological:      Mental Status: She is alert and oriented to person, place, and time.      Sensory: Sensation is intact.   Psychiatric:         Mood and Affect: Mood and affect normal.       Patient was wearing a face mask when I entered the room and they continued to wear a mask throughout their stay in the ED.  I wore PPE, including  gloves, face mask with shield or face mask with goggles whenever I was in the room with patient.       LAB RESULTS  Recent Results (from the past 24 hour(s))   Comprehensive Metabolic Panel    Collection Time: 06/10/21  9:36 AM    Specimen: Blood   Result Value Ref Range    Glucose 91 65 - 99 mg/dL    BUN 6 6 - 20 mg/dL    Creatinine 0.82 0.57 - 1.00 mg/dL    Sodium 140 136 - 145 mmol/L    Potassium 3.8 3.5 - 5.2 mmol/L    Chloride 106 98 - 107 mmol/L    CO2 25.5 22.0 - 29.0 mmol/L    Calcium 8.7 8.6 - 10.5 mg/dL    Total Protein 6.5 6.0 - 8.5 g/dL    Albumin 4.20 3.50 - 5.20 g/dL    ALT (SGPT) 8 1 - 33 U/L    AST (SGOT) 9 1 - 32 U/L    Alkaline Phosphatase 42 39 - 117 U/L    Total Bilirubin 0.3 0.0 - 1.2 mg/dL    eGFR Non African Amer 78 >60 mL/min/1.73    Globulin 2.3 gm/dL    A/G Ratio 1.8 g/dL    BUN/Creatinine Ratio 7.3 7.0 - 25.0    Anion Gap 8.5 5.0 - 15.0 mmol/L   CBC Auto Differential    Collection Time: 06/10/21  9:36 AM    Specimen: Blood   Result Value Ref Range    WBC 5.50 3.40 - 10.80 10*3/mm3    RBC 4.10 3.77 - 5.28 10*6/mm3    Hemoglobin 13.2 12.0 - 15.9  g/dL    Hematocrit 39.6 34.0 - 46.6 %    MCV 96.6 79.0 - 97.0 fL    MCH 32.2 26.6 - 33.0 pg    MCHC 33.3 31.5 - 35.7 g/dL    RDW 12.5 12.3 - 15.4 %    RDW-SD 44.6 37.0 - 54.0 fl    MPV 9.8 6.0 - 12.0 fL    Platelets 218 140 - 450 10*3/mm3    Neutrophil % 52.7 42.7 - 76.0 %    Lymphocyte % 34.4 19.6 - 45.3 %    Monocyte % 7.6 5.0 - 12.0 %    Eosinophil % 3.3 0.3 - 6.2 %    Basophil % 1.3 0.0 - 1.5 %    Immature Grans % 0.7 (H) 0.0 - 0.5 %    Neutrophils, Absolute 2.90 1.70 - 7.00 10*3/mm3    Lymphocytes, Absolute 1.89 0.70 - 3.10 10*3/mm3    Monocytes, Absolute 0.42 0.10 - 0.90 10*3/mm3    Eosinophils, Absolute 0.18 0.00 - 0.40 10*3/mm3    Basophils, Absolute 0.07 0.00 - 0.20 10*3/mm3    Immature Grans, Absolute 0.04 0.00 - 0.05 10*3/mm3    nRBC 0.0 0.0 - 0.2 /100 WBC   ECG 12 Lead    Collection Time: 06/10/21 10:04 AM   Result Value Ref Range    QT Interval 451 ms       Ordered the above labs and reviewed the results.        RADIOLOGY  No orders to display                PROCEDURES  Procedures      EKG:          EKG time: 1004  Rhythm/Rate: Sinus bradycardia 55  P waves and LA: Normal P waves  QRS, axis: Normal QRS  ST and T waves: Normal ST-T waves    Interpreted Contemporaneously by me, independently viewed  Unchanged compared to prior 11/24/2017        MEDICATIONS GIVEN IN ER  Medications   sodium chloride 0.9 % bolus 1,000 mL (0 mL Intravenous Stopped 6/10/21 1111)             PROGRESS AND CONSULTS  ED Course as of Chuck 10 1211   Thu Chuck 10, 2021   1102 11:02 EDT  Patient presents for evaluation of near syncope.  Has been having lightheadedness and dizziness for about a year.  Patient had an episode of lightheadedness today.  Patient's EKG and lab work is normal.  Patient is not orthostatic.  May have been vasovagal.  Patient will be discharged home.  She will follow up with [facility]ology.  Understands to return here for worsening symptoms.    [SL]      ED Course User Index  [SL] Arturo Yancey MD            MEDICAL DECISION MAKING      MDM  Number of Diagnoses or Management Options     Amount and/or Complexity of Data Reviewed  Clinical lab tests: reviewed and ordered (Normal CBC and CMP)  Tests in the medicine section of CPT®: reviewed               DIAGNOSIS  Final diagnoses:   Near syncope           DISPOSITION  DISCHARGE    Patient discharged in stable condition.    Reviewed implications of results, diagnosis, meds, responsibility to follow up, warning signs and symptoms of possible worsening, potential complications and reasons to return to ER, including worsening symptoms.    Patient/Family voiced understanding of above instructions.    Discussed plan for discharge, as there is no emergent indication for admission. Patient referred to primary care provider for BP management due to today's BP. Pt/family is agreeable and understands need for follow up and repeat testing.  Pt is aware that discharge does not mean that nothing is wrong but it indicates no emergency is present that requires admission and they must continue care with follow-up as given below or physician of their choice.     FOLLOW-UP  [facility] CARDIOLOGY  [address]  [facility] [id number] [phone]  Schedule an appointment as soon as possible for a visit            Medication List      No changes were made to your prescriptions during this visit.             Latest Documented Vital Signs:  As of 12:11 EDT  BP- 122/85 HR- 66 Temp- 97.9 °F (36.6 °C) (Tympanic) O2 sat- 98%                         Arturo Yancey MD  06/10/21 1212

## 2021-06-10 NOTE — ED TRIAGE NOTES
Dizziness upon awaking this morning.  Pt wearing mask on arrival. I was wearing mask during triage

## 2021-08-04 ENCOUNTER — DOCUMENTATION (OUTPATIENT)
Dept: PHYSICAL THERAPY | Facility: CLINIC | Age: 36
End: 2021-08-04

## 2021-08-04 NOTE — PROGRESS NOTES
Discharge Summary  Discharge Summary from Physical Therapy Report      Dates  PT visit: 4/28/21-5/19/21 (was scheduled through 7/21/21 but did not attend remaining appointments_  Number of Visits: 2     Principal Treatments Provided: manual therapy, self care/pt ed, neuromuscular reeducation, therapeutic exercise, therapeutic activities, modalities as needed       Goals: Partially Met    Prognosis: Poor due to number of treatments received vs. recommended      Comments/ Objective Status: See note from most recent treatment 5/19/21 for status upon discharge.    Reason for plan status: Patient non-compliant with treatment plan or exercise program  (did not attend scheduled appointments)        Plan of care: Continue with current home exercise program as instructed; recommend return to PT when patient is able      Date of Discharge: 8/4/21        MANOLO Trujillo, PT, DPT, WCS  Physical Therapist

## 2021-10-07 DIAGNOSIS — F41.9 ANXIETY: ICD-10-CM

## 2021-10-07 DIAGNOSIS — F32.A DEPRESSION, UNSPECIFIED DEPRESSION TYPE: ICD-10-CM

## 2021-10-08 RX ORDER — BUPROPION HYDROCHLORIDE 150 MG/1
TABLET ORAL
Qty: 30 TABLET | Refills: 0 | Status: SHIPPED | OUTPATIENT
Start: 2021-10-08 | End: 2021-11-15

## 2021-11-14 DIAGNOSIS — F32.A DEPRESSION, UNSPECIFIED DEPRESSION TYPE: ICD-10-CM

## 2021-11-14 DIAGNOSIS — F41.9 ANXIETY: ICD-10-CM

## 2021-11-15 RX ORDER — BUPROPION HYDROCHLORIDE 150 MG/1
TABLET ORAL
Qty: 90 TABLET | Refills: 3 | Status: SHIPPED | OUTPATIENT
Start: 2021-11-15 | End: 2022-09-15

## 2022-09-15 ENCOUNTER — OFFICE VISIT (OUTPATIENT)
Dept: FAMILY MEDICINE CLINIC | Facility: CLINIC | Age: 37
End: 2022-09-15

## 2022-09-15 VITALS
DIASTOLIC BLOOD PRESSURE: 72 MMHG | WEIGHT: 113 LBS | HEIGHT: 65 IN | HEART RATE: 64 BPM | BODY MASS INDEX: 18.83 KG/M2 | SYSTOLIC BLOOD PRESSURE: 110 MMHG | TEMPERATURE: 96.9 F | OXYGEN SATURATION: 99 %

## 2022-09-15 DIAGNOSIS — L73.9 FOLLICULITIS OF RIGHT AXILLA: Primary | ICD-10-CM

## 2022-09-15 PROBLEM — R07.9 CHEST PAIN: Status: RESOLVED | Noted: 2017-12-15 | Resolved: 2022-09-15

## 2022-09-15 PROCEDURE — 99214 OFFICE O/P EST MOD 30 MIN: CPT | Performed by: FAMILY MEDICINE

## 2022-09-15 RX ORDER — CEPHALEXIN 500 MG/1
500 CAPSULE ORAL 3 TIMES DAILY
Qty: 21 CAPSULE | Refills: 0 | Status: SHIPPED | OUTPATIENT
Start: 2022-09-15 | End: 2022-09-22

## 2022-09-15 RX ORDER — BUPROPION HYDROCHLORIDE 150 MG/1
1 TABLET ORAL DAILY
COMMUNITY
End: 2022-09-15

## 2022-09-15 NOTE — PROGRESS NOTES
Subjective     Peggy Suero is a 37 y.o. female.     Chief Complaint   Patient presents with   • Establish Care     New pt - nonfasting    • Mass     [place] sized red lump in R axillary. Sore, increasing in size over last week        History of Present Illness     Pt usually sees LUCIANA Sauceda , today to establish care with me to discuss the following;    She is c/o Rt axillary nodule for 1 week with swelling and redness, hurts to touch. Her sx got worse lately , the size got bigger with more redness/swelling and more pain. No h/o UE injury   No F,C       The following portions of the patient's history were reviewed and updated as appropriate: allergies, current medications, past family history, past medical history, past social history, past surgical history and problem list.        Review of Systems   Constitutional: Negative for activity change, appetite change, chills and fever.   Skin: Positive for color change and skin lesions.       Vitals:    09/15/22 1534   BP: 110/72   Pulse: 64   Temp: 96.9 °F (36.1 °C)   SpO2: 99%           09/15/22  1534   Weight: 51.3 kg (113 lb)         Body mass index is 18.8 kg/m².      Current Outpatient Medications   Medication Sig Dispense Refill   • CBD (cannabidiol) oral oil Take  by mouth Every Night.     • cephalexin (Keflex) 500 MG capsule Take 1 capsule by mouth 3 (Three) Times a Day for 7 days. 21 capsule 0     No current facility-administered medications for this visit.                Objective   Physical Exam  Vitals and nursing note reviewed.   Constitutional:       General: She is not in acute distress.     Appearance: She is not ill-appearing, toxic-appearing or diaphoretic.   HENT:      Mouth/Throat:      Mouth: Mucous membranes are moist.   Cardiovascular:      Rate and Rhythm: Normal rate and regular rhythm.      Heart sounds: Normal heart sounds. No murmur heard.  Pulmonary:      Effort: Pulmonary effort is normal. No respiratory distress.      Breath sounds:  Normal breath sounds. No stridor. No wheezing or rhonchi.   Lymphadenopathy:      Cervical: No cervical adenopathy.   Skin:     General: Skin is warm.      Comments: ~ 1 x 1 cm redness with swelling at the Rt axilla with TTP   Neurological:      Mental Status: She is alert and oriented to person, place, and time.   Psychiatric:         Mood and Affect: Mood normal.         Behavior: Behavior normal.         Thought Content: Thought content normal.         Judgment: Judgment normal.           Assessment & Plan   Diagnoses and all orders for this visit:    1. Folliculitis of right axilla (Primary)  -     cephalexin (Keflex) 500 MG capsule; Take 1 capsule by mouth 3 (Three) Times a Day for 7 days.  Dispense: 21 capsule; Refill: 0  - Discussed supportive care   - Discussed result to complete resolution vs abscess formation , in this case needs to get I&D       I was wearing N95 mask and eye protection during the entire duration of the visit.   Patient was masked the whole entire time.   Minimum social distance of 6 ft maintained through entire visit except for physical exam as documented.          I have fully discussed the nature of the medical condition(s) risks, complications, management, safe and proper use of medications.   She stated no allergy to the above prescribed medication.  I have discussed the SIDE EFFECT OF MEDICATION and importance TO report any side effect , the patient expressed good understanding.  Encouraged medication compliance and the importance of keeping scheduled follow up appointments with me and any other providers.    Patient instructed to follow up with our office for results on any labs/imaging ordered during this visit.    Home care discussed  All questions answered  Patient verbalizes understanding and agrees to treatment plan.     Follow up: Return for if no better or worsening symptoms.

## 2022-12-31 NOTE — TELEPHONE ENCOUNTER
Evelyn from  called to see if pt is going to have her CT at  on Wed as scheduled. Needs to get PA started. Called ptMELIDA on VM asking her to call us back to confirm.  
Patient

## 2025-07-30 NOTE — PROGRESS NOTES
"                                                          PHYSICAL THERAPY DAILY PROGRESS NOTE    Patient: Peggy Suero   : 1985  Diagnosis/ICD-10 Code:  Vaginismus [N94.2]  Referring practitioner: Isabel Kline*  Date of Initial Visit: Type: THERAPY  Noted: 2021  Today's Date: 2021  Patient seen for 2 sessions           Subjective Doing exercises as instructed without problems, expressed understanding of instruction given. Had pelvic US today, states was told \"all looks good\"; had cervcal biopsy earlier today, awaiting results.      Objective   No internal pelvic floor Rx due to procedure as above (on pelvic rest).   L positive [place]. R ant L posterior innom, negative pubic shotgun    See Exercise, Manual, and Modality Logs for complete treatment.     Patient was educated in updated HEP with handouts/supplies provided, expected and potential response to Rx, and goals and plan were reviewed with the patient expressing understanding/agreement. Instructed in/reviewed proper work/ADL performance to promote ideal pelvic position.    Assessment/Plan Today's Rx addressed pelvic positional faults and flexibility strength deficits with internal pelvic floor Rx held/not performed due to procedure as above; tolerant to Rx without adverse response; needs to continue PT to address presenting symptoms.       Plan: Internal pelvic floor rx as indicated next 1-2 Rxs, continue to address positional faults and strength/motor control deficits, adjust POC as indicated/needed due to presentation/findings.    (results of today's medical procedure).       Visit Diagnoses:    ICD-10-CM ICD-9-CM   1. Vaginismus  N94.2 625.1   2. Pelvic and perineal pain  R10.2 625.9   3. Sacroiliitis (CMS/[facility])  M46.1 720.2   4. Lack of coordination  R27.9 781.3       Timed:  Manual Therapy:    18     mins  40563;  Therapeutic Exercise:    10     mins  25288;     Neuromuscular [place]:   12    mins  69543;    Therapeutic " Activity:          mins  28931;     Gait Training:           mins  06669;     Ultrasound:          mins  50709;    Electrical Stimulation:         mins  18142 ( );    Untimed:  Electrical Stimulation:         mins  08829 ( );  Mechanical Traction:         mins  75247;   Dry Needling          mins self-pay  Traction          mins 00282  Low Eval          Mins  20080  Mod Eval          Mins  82854  High Eval                            Mins  75313  Re-Eval                               mins  30773    Timed Treatment:  40    mins   Total Treatment:    40    mins    PT SIGNATURE: MANOLO Trujillo PT, DPT, WCS   DATE: 5/19/2021       [email or web address]

## (undated) DEVICE — THE TORRENT IRRIGATION SCOPE CONNECTOR IS USED WITH THE TORRENT IRRIGATION TUBING TO PROVIDE IRRIGATION FLUIDS SUCH AS STERILE WATER DURING GASTROINTESTINAL ENDOSCOPIC PROCEDURES WHEN USED IN CONJUNCTION WITH AN IRRIGATION PUMP (OR ELECTROSURGICAL UNIT).: Brand: TORRENT

## (undated) DEVICE — LN SMPL CO2 SHTRM SD STREAM W/M LUER

## (undated) DEVICE — ADAPT CLN BIOGUARD AIR/H2O DISP

## (undated) DEVICE — TUBING, SUCTION, 1/4" X 10', STRAIGHT: Brand: MEDLINE

## (undated) DEVICE — SENSR O2 OXIMAX FNGR A/ 18IN NONSTR

## (undated) DEVICE — SINGLE-USE BIOPSY FORCEPS: Brand: RADIAL JAW 4

## (undated) DEVICE — CANN O2 ETCO2 FITS ALL CONN CO2 SMPL A/ 7IN DISP LF

## (undated) DEVICE — BITEBLOCK OMNI BLOC

## (undated) DEVICE — KT ORCA ORCAPOD DISP STRL